# Patient Record
Sex: FEMALE | Race: WHITE | NOT HISPANIC OR LATINO | Employment: FULL TIME | RURAL
[De-identification: names, ages, dates, MRNs, and addresses within clinical notes are randomized per-mention and may not be internally consistent; named-entity substitution may affect disease eponyms.]

---

## 2017-09-13 ENCOUNTER — OFFICE VISIT (OUTPATIENT)
Dept: RETAIL CLINIC | Facility: CLINIC | Age: 27
End: 2017-09-13

## 2017-09-13 VITALS
HEART RATE: 105 BPM | WEIGHT: 157.8 LBS | TEMPERATURE: 98.5 F | BODY MASS INDEX: 27.09 KG/M2 | OXYGEN SATURATION: 98 % | RESPIRATION RATE: 20 BRPM

## 2017-09-13 DIAGNOSIS — J30.1 ALLERGIC RHINITIS DUE TO POLLEN, UNSPECIFIED RHINITIS SEASONALITY: Primary | ICD-10-CM

## 2017-09-13 PROCEDURE — 99213 OFFICE O/P EST LOW 20 MIN: CPT | Performed by: NURSE PRACTITIONER

## 2017-09-13 RX ORDER — FLUTICASONE PROPIONATE 50 MCG
2 SPRAY, SUSPENSION (ML) NASAL DAILY
Qty: 1 BOTTLE | Refills: 0 | Status: SHIPPED | OUTPATIENT
Start: 2017-09-13 | End: 2017-10-13

## 2017-09-13 NOTE — PATIENT INSTRUCTIONS
Nasal Allergies  Nasal allergies are a reaction to allergens in the air. Allergens are particles in the air that cause your body to have an allergic reaction. Nasal allergies are not passed from person to person (are not contagious). They cannot be cured, but they can be controlled.  CAUSES  Seasonal nasal allergies (hay fever) are caused by pollen allergens that come from grasses, trees, and weeds. Year-round nasal allergies (perennial allergic rhinitis) are caused by allergens such as house dust mites, pet dander, and mold spores.  RISK FACTORS  The following factors may make you more likely to develop this condition:  · Having certain health conditions. These include:    Other types of allergies, such as food allergies.    Asthma.    Eczema.  · Having a close relative who has allergies or asthma.  · Exposure to house dust, pollen, dander, or other allergens at home or at work.  · Exposure to air pollution or secondhand smoke when you were a child.  SYMPTOMS  Symptoms of this condition include:  · Sneezing.  · Runny nose or stuffy nose (congestion).  · Watery (tearing) eyes.  · Itchy eyes, nose, mouth, throat, skin, or other area.  · Sore throat.  · Headache.  · Decreased sense of smell or taste.  · Fatigue. This may occur if you have trouble sleeping due to allergies.  · Swollen eyelids.  DIAGNOSIS  This condition is diagnosed with a medical history and physical exam. Allergy testing may be done to determine exactly what triggers your nasal allergies.  TREATMENT  There is no cure for nasal allergies. Treatment focuses on controlling your symptoms, and it may include:  · Medicines that block allergy symptoms. These may include allergy shots, nasal sprays, and oral antihistamines.  · Avoiding the allergen.  HOME CARE INSTRUCTIONS  · Avoid the allergen that is causing your symptoms, if possible.  · Keep windows closed. If possible, use air conditioning when pollen counts are high.  · Do not use fans in your  home.  · Do not hang clothes outside to dry.  · Wear sunglasses to keep pollen out of your eyes.  · Wash your hands right away after you touch household pets.  · Take over-the-counter and prescription medicines only as told by your health care provider.  · Keep all follow-up visits as told by your health care provider. This is important.  SEEK MEDICAL CARE IF:  · You have a fever.  · You develop a cough that does not go away (is persistent).  · You start to wheeze.  · Your symptoms do not improve with treatment.  · You have thick nasal discharge.  · You start to have nosebleeds.  SEEK IMMEDIATE MEDICAL CARE IF:  · Your tongue or your lips are swollen.  · You have trouble breathing.  · You feel light-headed or you feel like you are going to faint.  · You have cold sweats.     This information is not intended to replace advice given to you by your health care provider. Make sure you discuss any questions you have with your health care provider.     Document Released: 12/18/2006 Document Revised: 04/10/2017 Document Reviewed: 06/29/2016  [employer] Interactive Patient Education ©2017 [employer]

## 2017-09-13 NOTE — PROGRESS NOTES
Subjective   Evi Singer is a 27 y.o. female.   Chief Complaint   Patient presents with   • Earache      Earache    There is pain in both ears. This is a new problem. The current episode started in the past 7 days. The problem has been waxing and waning. There has been no fever. Associated symptoms include coughing, rhinorrhea (clear) and a sore throat. Pertinent negatives include no headaches or rash. She has tried nothing for the symptoms.          Evi presents to [facility] with cc of earache today.  Reviewed PMFSH.  See ROS.      The following portions of the patient's history were reviewed and updated as appropriate: allergies, current medications, past family history, past medical history, past social history, past surgical history and problem list.    Review of Systems   Constitutional: Positive for fatigue. Negative for fever.   HENT: Positive for congestion, ear pain, rhinorrhea (clear), sneezing and sore throat.    Eyes: Positive for itching.   Respiratory: Positive for cough. Negative for chest tightness.    Cardiovascular: Negative for chest pain.   Endocrine: Negative for cold intolerance.   Musculoskeletal: Negative for arthralgias.   Skin: Negative for rash.   Neurological: Negative for headaches.   Hematological: Negative for adenopathy.     Pulse 105  Temp 98.5 °F (36.9 °C) (Temporal Artery )   Resp 20  Wt 157 lb 12.8 oz (71.6 kg)  LMP 09/06/2017 (Approximate)  SpO2 98%  BMI 27.09 kg/m2    Objective     Current Outpatient Prescriptions:   •  fluticasone (FLONASE) 50 MCG/ACT nasal spray, 2 sprays into each nostril Daily for 30 days., Disp: 1 bottle, Rfl: 0  Allergies   Allergen Reactions   • Cinnamon Anaphylaxis   • Red Dye Hives       Physical Exam   Constitutional: She is oriented to person, place, and time. She appears well-developed and well-nourished. No distress.   HENT:   Head: Normocephalic and atraumatic.   Right Ear: Tympanic membrane and external ear normal.   Left Ear:  Tympanic membrane and external ear normal.   Nose: Mucosal edema present. Right sinus exhibits no maxillary sinus tenderness and no frontal sinus tenderness. Left sinus exhibits no maxillary sinus tenderness and no frontal sinus tenderness.   Mouth/Throat: Uvula is midline, oropharynx is clear and moist and mucous membranes are normal. No tonsillar abscesses.   Eyes: Conjunctivae and EOM are normal. Pupils are equal, round, and reactive to light.   Neck: Normal range of motion. Neck supple.   Cardiovascular: Regular rhythm and normal heart sounds.  Tachycardia present.    Pulmonary/Chest: Effort normal and breath sounds normal. No respiratory distress. She has no wheezes.   Abdominal: Soft. Bowel sounds are normal.   Musculoskeletal: Normal range of motion.   Lymphadenopathy:     She has no cervical adenopathy.   Neurological: She is alert and oriented to person, place, and time.   Skin: Skin is warm and dry. No rash noted.   Psychiatric: She has a normal mood and affect. Her behavior is normal. Judgment and thought content normal.   Nursing note and vitals reviewed.      Assessment/Plan   Evi was seen today for earache.    Diagnoses and all orders for this visit:    Allergic rhinitis due to pollen, unspecified rhinitis seasonality  -     fluticasone (FLONASE) 50 MCG/ACT nasal spray; 2 sprays into each nostril Daily for 30 days.

## 2017-11-26 ENCOUNTER — OFFICE VISIT (OUTPATIENT)
Dept: RETAIL CLINIC | Facility: CLINIC | Age: 27
End: 2017-11-26

## 2017-11-26 VITALS — TEMPERATURE: 97.8 F | RESPIRATION RATE: 20 BRPM | HEART RATE: 104 BPM | OXYGEN SATURATION: 98 %

## 2017-11-26 DIAGNOSIS — R31.9 URINARY TRACT INFECTION WITH HEMATURIA, SITE UNSPECIFIED: Primary | ICD-10-CM

## 2017-11-26 DIAGNOSIS — N39.0 URINARY TRACT INFECTION WITH HEMATURIA, SITE UNSPECIFIED: Primary | ICD-10-CM

## 2017-11-26 LAB
BILIRUB BLD-MCNC: NEGATIVE MG/DL
GLUCOSE UR STRIP-MCNC: NEGATIVE MG/DL
KETONES UR QL: NEGATIVE
LEUKOCYTE EST, POC: ABNORMAL
NITRITE UR-MCNC: NEGATIVE MG/ML
PH UR: 6 [PH] (ref 5–8)
PROT UR STRIP-MCNC: ABNORMAL MG/DL
RBC # UR STRIP: ABNORMAL /UL
SP GR UR: 1.01 (ref 1–1.03)
UROBILINOGEN UR QL: NORMAL

## 2017-11-26 PROCEDURE — 81002 URINALYSIS NONAUTO W/O SCOPE: CPT | Performed by: NURSE PRACTITIONER

## 2017-11-26 PROCEDURE — 99213 OFFICE O/P EST LOW 20 MIN: CPT | Performed by: NURSE PRACTITIONER

## 2017-11-26 RX ORDER — SULFAMETHOXAZOLE AND TRIMETHOPRIM 800; 160 MG/1; MG/1
1 TABLET ORAL 2 TIMES DAILY
Qty: 20 TABLET | Refills: 0 | Status: SHIPPED | OUTPATIENT
Start: 2017-11-26 | End: 2017-12-06

## 2017-11-26 RX ORDER — FLUCONAZOLE 150 MG/1
150 TABLET ORAL ONCE
Qty: 2 TABLET | Refills: 0 | Status: SHIPPED | OUTPATIENT
Start: 2017-11-26 | End: 2017-11-26

## 2017-11-26 RX ORDER — PHENAZOPYRIDINE HYDROCHLORIDE 200 MG/1
200 TABLET, FILM COATED ORAL 3 TIMES DAILY PRN
Qty: 6 TABLET | Refills: 0 | Status: SHIPPED | OUTPATIENT
Start: 2017-11-26 | End: 2017-11-28

## 2017-11-26 NOTE — PATIENT INSTRUCTIONS
Urinary Tract Infection, Adult  A urinary tract infection (UTI) is an infection of any part of the urinary tract. The urinary tract includes the:  · Kidneys.  · Ureters.  · Bladder.  · Urethra.  These organs make, store, and get rid of pee (urine) in the body.   HOME CARE   · Take over-the-counter and prescription medicines only as told by your doctor.    · If you were prescribed an antibiotic medicine, take it as told by your doctor. Do not stop taking the antibiotic even if you start to feel better.    · Avoid the following drinks:    Alcohol.    Caffeine.    Tea.    Carbonated drinks.  · Drink enough fluid to keep your pee clear or pale yellow.    · Keep all follow-up visits as told by your doctor. This is important.    · Make sure to:      Empty your bladder often and completely. Do not to hold pee for long periods of time.      Empty your bladder before and after sex.      Wipe from front to back after a bowel movement if you are female. Use each tissue one time when you wipe.    GET HELP IF:   · You have back pain.    · You have a fever.    · You feel sick to your stomach (nauseous).  · You throw up (vomit).  · Your symptoms do not get better after 3 days.    · Your symptoms go away and then come back.  GET HELP RIGHT AWAY IF:   · You have very bad back pain.  · You have very bad lower belly (abdominal) pain.  · You are throwing up and cannot keep down any medicines or water.       This information is not intended to replace advice given to you by your health care provider. Make sure you discuss any questions you have with your health care provider.     Document Released: 06/05/2009 Document Revised: 04/10/2017 Document Reviewed: 11/07/2016  [employer] Interactive Patient Education ©2017 [employer]

## 2017-11-26 NOTE — PROGRESS NOTES
Subjective   Evi Singer is a 27 y.o. female.   Chief Complaint   Patient presents with   • Urinary Tract Infection      Urinary Tract Infection    This is a new problem. Episode onset: three days. The problem occurs every urination. The problem has been gradually worsening. The quality of the pain is described as aching and burning. The pain is at a severity of 7/10. The pain is moderate. Associated symptoms include frequency, hematuria and urgency. Treatments tried: AZO. The treatment provided no relief. Her past medical history is significant for kidney stones, recurrent UTIs and a urological procedure.        The following portions of the patient's history were reviewed and updated as appropriate: allergies, current medications, past family history, past medical history, past social history, past surgical history and problem list.    Review of Systems   Genitourinary: Positive for dysuria, frequency, hematuria and urgency.   All other systems reviewed and are negative.      Objective   Allergies   Allergen Reactions   • Cinnamon Anaphylaxis   • Red Dye Hives       Physical Exam   Constitutional: She is oriented to person, place, and time. She appears well-developed and well-nourished.   Abdominal: There is tenderness in the suprapubic area. There is no rigidity, no rebound, no guarding and no CVA tenderness.   Lower back pain   Neurological: She is alert and oriented to person, place, and time.   Skin: Skin is warm and dry.   Psychiatric: She has a normal mood and affect.   Vitals reviewed.      Assessment/Plan   Evi was seen today for urinary tract infection.    Diagnoses and all orders for this visit:    Urinary tract infection with hematuria, site unspecified  -     POC Urinalysis Dipstick  -     Urine Culture - Urine, Urine, Clean Catch    Other orders  -     sulfamethoxazole-trimethoprim (BACTRIM DS,SEPTRA DS) 800-160 MG per tablet; Take 1 tablet by mouth 2 (Two) Times a Day for 10 days.  -      phenazopyridine (PYRIDIUM) 200 MG tablet; Take 1 tablet by mouth 3 (Three) Times a Day As Needed for bladder spasms for up to 2 days.  -     fluconazole (DIFLUCAN) 150 MG tablet; Take 1 tablet by mouth 1 (One) Time for 1 dose. Take one tab at onset of symptoms and the other at end of tx          Results for orders placed or performed in visit on 11/26/17   POC Urinalysis Dipstick   Result Value Ref Range    Glucose, UA Negative Negative, 1000 mg/dL (3+) mg/dL    Bilirubin Negative Negative    Ketones, UA Negative Negative    Specific Gravity  1.010 1.005 - 1.030    Blood, UA Trace (A) Negative    pH, Urine 6.0 5.0 - 8.0    Protein, POC Trace (A) Negative mg/dL    Urobilinogen, UA Normal Normal    Leukocytes Moderate (2+) (A) Negative    Nitrite, UA Negative Negative            This document has been electronically signed by VESTA Abraham November 26, 2017 12:19 PM

## 2017-11-29 LAB
BACTERIA UR CULT: ABNORMAL
BACTERIA UR CULT: ABNORMAL
OTHER ANTIBIOTIC SUSC ISLT: ABNORMAL

## 2017-12-31 ENCOUNTER — OFFICE VISIT (OUTPATIENT)
Dept: RETAIL CLINIC | Facility: CLINIC | Age: 27
End: 2017-12-31

## 2017-12-31 VITALS
RESPIRATION RATE: 20 BRPM | TEMPERATURE: 98 F | HEIGHT: 66 IN | BODY MASS INDEX: 25.23 KG/M2 | OXYGEN SATURATION: 99 % | WEIGHT: 157 LBS

## 2017-12-31 DIAGNOSIS — N30.01 ACUTE CYSTITIS WITH HEMATURIA: Primary | ICD-10-CM

## 2017-12-31 LAB
BILIRUB BLD-MCNC: NEGATIVE MG/DL
CLARITY, POC: ABNORMAL
COLOR UR: ABNORMAL
GLUCOSE UR STRIP-MCNC: NEGATIVE MG/DL
KETONES UR QL: NEGATIVE
LEUKOCYTE EST, POC: ABNORMAL
NITRITE UR-MCNC: POSITIVE MG/ML
PH UR: 6 [PH] (ref 5–8)
PROT UR STRIP-MCNC: ABNORMAL MG/DL
RBC # UR STRIP: ABNORMAL /UL
SP GR UR: 1.03 (ref 1–1.03)
UROBILINOGEN UR QL: NORMAL

## 2017-12-31 PROCEDURE — 81002 URINALYSIS NONAUTO W/O SCOPE: CPT | Performed by: NURSE PRACTITIONER

## 2017-12-31 PROCEDURE — 99213 OFFICE O/P EST LOW 20 MIN: CPT | Performed by: NURSE PRACTITIONER

## 2017-12-31 RX ORDER — PHENAZOPYRIDINE HYDROCHLORIDE 200 MG/1
200 TABLET, FILM COATED ORAL 3 TIMES DAILY PRN
Qty: 6 TABLET | Refills: 0 | Status: ON HOLD | OUTPATIENT
Start: 2017-12-31 | End: 2021-07-31

## 2017-12-31 RX ORDER — FLUCONAZOLE 150 MG/1
150 TABLET ORAL ONCE
Qty: 1 TABLET | Refills: 0 | Status: SHIPPED | OUTPATIENT
Start: 2017-12-31 | End: 2017-12-31

## 2017-12-31 RX ORDER — NITROFURANTOIN 25; 75 MG/1; MG/1
100 CAPSULE ORAL EVERY 12 HOURS SCHEDULED
Qty: 14 CAPSULE | Refills: 0 | Status: ON HOLD | OUTPATIENT
Start: 2017-12-31 | End: 2021-07-31

## 2017-12-31 NOTE — PATIENT INSTRUCTIONS
Urinary Tract Infection, Adult  A urinary tract infection (UTI) is an infection of any part of the urinary tract. The urinary tract includes the:  · Kidneys.  · Ureters.  · Bladder.  · Urethra.  These organs make, store, and get rid of pee (urine) in the body.   HOME CARE   · Take over-the-counter and prescription medicines only as told by your doctor.    · If you were prescribed an antibiotic medicine, take it as told by your doctor. Do not stop taking the antibiotic even if you start to feel better.    · Avoid the following drinks:    Alcohol.    Caffeine.    Tea.    Carbonated drinks.  · Drink enough fluid to keep your pee clear or pale yellow.    · Keep all follow-up visits as told by your doctor. This is important.    · Make sure to:      Empty your bladder often and completely. Do not to hold pee for long periods of time.      Empty your bladder before and after sex.      Wipe from front to back after a bowel movement if you are female. Use each tissue one time when you wipe.    GET HELP IF:   · You have back pain.    · You have a fever.    · You feel sick to your stomach (nauseous).  · You throw up (vomit).  · Your symptoms do not get better after 3 days.    · Your symptoms go away and then come back.  GET HELP RIGHT AWAY IF:   · You have very bad back pain.  · You have very bad lower belly (abdominal) pain.  · You are throwing up and cannot keep down any medicines or water.       This information is not intended to replace advice given to you by your health care provider. Make sure you discuss any questions you have with your health care provider.     Document Released: 06/05/2009 Document Revised: 04/10/2017 Document Reviewed: 11/07/2016  [employer] Interactive Patient Education ©2017 [employer]

## 2017-12-31 NOTE — PROGRESS NOTES
"Subjective   Evi Singer is a 27 y.o. female.   Chief Complaint   Patient presents with   • Urinary Frequency      Urinary Frequency    This is a new problem. The current episode started in the past 7 days (5 days). The problem occurs every urination. The problem has been gradually worsening. The quality of the pain is described as burning. The pain is at a severity of 7/10. There has been no fever. She is not sexually active. There is no history of pyelonephritis. Associated symptoms include frequency, hesitancy and urgency. Pertinent negatives include no flank pain.        The following portions of the patient's history were reviewed and updated as appropriate: allergies, current medications, past family history, past medical history, past social history, past surgical history and problem list.    Current Outpatient Prescriptions:   •  fluconazole (DIFLUCAN) 150 MG tablet, Take 1 tablet by mouth 1 (One) Time for 1 dose., Disp: 1 tablet, Rfl: 0  •  nitrofurantoin, macrocrystal-monohydrate, (MACROBID) 100 MG capsule, Take 1 capsule by mouth Every 12 (Twelve) Hours., Disp: 14 capsule, Rfl: 0  •  phenazopyridine (PYRIDIUM) 200 MG tablet, Take 1 tablet by mouth 3 (Three) Times a Day As Needed for bladder spasms., Disp: 6 tablet, Rfl: 0    Review of Systems   Constitutional: Negative.    HENT: Negative.    Eyes: Negative.    Respiratory: Negative.    Cardiovascular: Negative.    Gastrointestinal: Negative.    Genitourinary: Positive for frequency, hesitancy and urgency. Negative for flank pain.     Temp 98 °F (36.7 °C) (Temporal Artery )   Resp 20  Ht 166.4 cm (65.5\")  Wt 71.2 kg (157 lb)  LMP 12/27/2017  SpO2 99%  BMI 25.73 kg/m2    Objective   Allergies   Allergen Reactions   • Cinnamon Anaphylaxis   • Red Dye Hives       Physical Exam   Constitutional: She is oriented to person, place, and time. She appears well-developed and well-nourished.   Cardiovascular: Normal rate, regular rhythm and normal heart " sounds.  Exam reveals no gallop and no friction rub.    No murmur heard.  Pulmonary/Chest: Effort normal.   Abdominal: Soft. Bowel sounds are normal. She exhibits no mass. There is tenderness in the suprapubic area. There is no rebound, no guarding and no CVA tenderness. No hernia.   Neurological: She is alert and oriented to person, place, and time.   Psychiatric: She has a normal mood and affect. Her behavior is normal.   Vitals reviewed.      Assessment/Plan   Evi was seen today for urinary frequency.    Diagnoses and all orders for this visit:    Acute cystitis with hematuria  -     POC Urinalysis Dipstick    Other orders  -     nitrofurantoin, macrocrystal-monohydrate, (MACROBID) 100 MG capsule; Take 1 capsule by mouth Every 12 (Twelve) Hours.  -     phenazopyridine (PYRIDIUM) 200 MG tablet; Take 1 tablet by mouth 3 (Three) Times a Day As Needed for bladder spasms.  -     fluconazole (DIFLUCAN) 150 MG tablet; Take 1 tablet by mouth 1 (One) Time for 1 dose.                 December 31, 2017 2:14 PM

## 2020-02-06 ENCOUNTER — TRANSCRIBE ORDERS (OUTPATIENT)
Dept: ADMINISTRATIVE | Facility: HOSPITAL | Age: 30
End: 2020-02-06

## 2020-02-06 DIAGNOSIS — R43.0 ANOSMIA: Primary | ICD-10-CM

## 2020-02-06 DIAGNOSIS — J32.4 CHRONIC PANSINUSITIS: ICD-10-CM

## 2020-02-10 ENCOUNTER — APPOINTMENT (OUTPATIENT)
Dept: CT IMAGING | Facility: HOSPITAL | Age: 30
End: 2020-02-10

## 2020-02-11 ENCOUNTER — HOSPITAL ENCOUNTER (OUTPATIENT)
Dept: CT IMAGING | Facility: HOSPITAL | Age: 30
Discharge: HOME | End: 2020-02-11
Admitting: OTOLARYNGOLOGY

## 2020-02-11 DIAGNOSIS — J32.4 CHRONIC PANSINUSITIS: ICD-10-CM

## 2020-02-11 DIAGNOSIS — R43.0 ANOSMIA: ICD-10-CM

## 2020-02-11 LAB — B-HCG UR QL: NEGATIVE

## 2020-02-11 PROCEDURE — 70486 CT MAXILLOFACIAL W/O DYE: CPT | Performed by: RADIOLOGY

## 2020-02-11 PROCEDURE — 81025 URINE PREGNANCY TEST: CPT | Performed by: RADIOLOGY

## 2020-02-11 PROCEDURE — 70486 CT MAXILLOFACIAL W/O DYE: CPT

## 2020-11-04 ENCOUNTER — LAB (OUTPATIENT)
Dept: LAB | Facility: HOSPITAL | Age: 30
End: 2020-11-04

## 2020-11-04 ENCOUNTER — TRANSCRIBE ORDERS (OUTPATIENT)
Dept: ADMINISTRATIVE | Facility: HOSPITAL | Age: 30
End: 2020-11-04

## 2020-11-04 DIAGNOSIS — J01.90 ACUTE SINUSITIS, RECURRENCE NOT SPECIFIED, UNSPECIFIED LOCATION: Primary | ICD-10-CM

## 2020-11-04 PROCEDURE — 87070 CULTURE OTHR SPECIMN AEROBIC: CPT | Performed by: OTOLARYNGOLOGY

## 2020-11-04 PROCEDURE — 87205 SMEAR GRAM STAIN: CPT | Performed by: OTOLARYNGOLOGY

## 2020-11-07 LAB
BACTERIA SPEC AEROBE CULT: NORMAL
GRAM STN SPEC: NORMAL

## 2021-02-05 LAB
EXTERNAL ABO GROUPING: NORMAL
EXTERNAL ANTIBODY SCREEN: NEGATIVE
EXTERNAL CHLAMYDIA SCREEN: NORMAL
EXTERNAL GONORRHEA SCREEN: NORMAL
EXTERNAL HEPATITIS B SURFACE ANTIGEN: NEGATIVE
EXTERNAL RH FACTOR: POSITIVE
EXTERNAL RUBELLA QUALITATIVE: NORMAL
EXTERNAL SYPHILIS RPR SCREEN: NORMAL
HIV1 P24 AG SERPL QL IA: NEGATIVE

## 2021-05-05 ENCOUNTER — HOSPITAL ENCOUNTER (EMERGENCY)
Facility: HOSPITAL | Age: 31
Discharge: HOME | End: 2021-05-05
Attending: EMERGENCY MEDICINE | Admitting: EMERGENCY MEDICINE

## 2021-05-05 VITALS
HEART RATE: 92 BPM | OXYGEN SATURATION: 97 % | DIASTOLIC BLOOD PRESSURE: 83 MMHG | BODY MASS INDEX: 33.32 KG/M2 | TEMPERATURE: 98.3 F | WEIGHT: 200 LBS | HEIGHT: 65 IN | SYSTOLIC BLOOD PRESSURE: 144 MMHG | RESPIRATION RATE: 20 BRPM

## 2021-05-05 DIAGNOSIS — R21 RASH: Primary | ICD-10-CM

## 2021-05-05 PROCEDURE — 99283 EMERGENCY DEPT VISIT LOW MDM: CPT

## 2021-05-05 RX ORDER — CETIRIZINE HYDROCHLORIDE 10 MG/1
10 TABLET ORAL DAILY
Qty: 30 TABLET | Refills: 0 | Status: SHIPPED | OUTPATIENT
Start: 2021-05-05 | End: 2021-08-16 | Stop reason: HOSPADM

## 2021-05-05 RX ORDER — HYDROXYZINE HYDROCHLORIDE 25 MG/1
25 TABLET, FILM COATED ORAL EVERY 6 HOURS PRN
Qty: 30 TABLET | Refills: 0 | Status: SHIPPED | OUTPATIENT
Start: 2021-05-05 | End: 2021-08-16 | Stop reason: HOSPADM

## 2021-05-05 RX ORDER — HYDROXYZINE HYDROCHLORIDE 25 MG/1
25 TABLET, FILM COATED ORAL ONCE
Status: COMPLETED | OUTPATIENT
Start: 2021-05-05 | End: 2021-05-05

## 2021-05-05 RX ORDER — CETIRIZINE HYDROCHLORIDE 10 MG/1
10 TABLET ORAL ONCE
Status: COMPLETED | OUTPATIENT
Start: 2021-05-05 | End: 2021-05-05

## 2021-05-05 RX ADMIN — HYDROXYZINE HYDROCHLORIDE 25 MG: 25 TABLET, FILM COATED ORAL at 14:26

## 2021-05-05 RX ADMIN — CETIRIZINE HYDROCHLORIDE 10 MG: 10 TABLET, FILM COATED ORAL at 14:26

## 2021-06-16 ENCOUNTER — HOSPITAL ENCOUNTER (OUTPATIENT)
Facility: HOSPITAL | Age: 31
End: 2021-06-16
Attending: OBSTETRICS & GYNECOLOGY | Admitting: OBSTETRICS & GYNECOLOGY

## 2021-06-16 ENCOUNTER — APPOINTMENT (OUTPATIENT)
Dept: ULTRASOUND IMAGING | Facility: HOSPITAL | Age: 31
End: 2021-06-16

## 2021-06-16 ENCOUNTER — HOSPITAL ENCOUNTER (OUTPATIENT)
Facility: HOSPITAL | Age: 31
Discharge: HOME | End: 2021-06-16
Attending: OBSTETRICS & GYNECOLOGY | Admitting: OBSTETRICS & GYNECOLOGY

## 2021-06-16 VITALS — BODY MASS INDEX: 38.46 KG/M2 | HEIGHT: 64 IN | TEMPERATURE: 98 F | RESPIRATION RATE: 18 BRPM | WEIGHT: 225.3 LBS

## 2021-06-16 PROBLEM — Z34.90 PREGNANT: Status: ACTIVE | Noted: 2021-06-16

## 2021-06-16 PROCEDURE — G0463 HOSPITAL OUTPT CLINIC VISIT: HCPCS

## 2021-06-16 PROCEDURE — 59025 FETAL NON-STRESS TEST: CPT

## 2021-06-16 PROCEDURE — 76819 FETAL BIOPHYS PROFIL W/O NST: CPT | Performed by: RADIOLOGY

## 2021-06-16 PROCEDURE — 76819 FETAL BIOPHYS PROFIL W/O NST: CPT

## 2021-06-16 NOTE — NON STRESS TEST
Evi Hare, a  at 28w2d with an JAMES of 2021, Date entered prior to episode creation, was seen at [facility] LABOR DELIVERY for a nonstress test.    No chief complaint on file.      Patient Active Problem List   Diagnosis   • Pregnant       Start Time: 1230  Stop Time: 1300    Interpretation A  Nonstress Test Interpretation A: Reactive (21 1348 : Klarissa Reyes, RN)  Comments A: VERIFIED WITH SARAH MCCARTY RN (21 1348 : Klarissa Reyes, RN)

## 2021-07-19 ENCOUNTER — HOSPITAL ENCOUNTER (OUTPATIENT)
Facility: HOSPITAL | Age: 31
Discharge: HOME | End: 2021-07-19
Attending: OBSTETRICS & GYNECOLOGY | Admitting: OBSTETRICS & GYNECOLOGY

## 2021-07-19 VITALS — RESPIRATION RATE: 18 BRPM | BODY MASS INDEX: 40.39 KG/M2 | TEMPERATURE: 97.7 F | WEIGHT: 236.6 LBS | HEIGHT: 64 IN

## 2021-07-19 PROBLEM — Z34.90 PREGNANCY: Status: ACTIVE | Noted: 2021-07-19

## 2021-07-19 PROCEDURE — 59025 FETAL NON-STRESS TEST: CPT

## 2021-07-19 PROCEDURE — G0463 HOSPITAL OUTPT CLINIC VISIT: HCPCS

## 2021-07-19 NOTE — DISCHARGE INSTRUCTIONS
Nonstress Test  A nonstress test is a procedure that is done during pregnancy in order to check the baby's heartbeat. The procedure can help show if the baby (fetus) is healthy. It is commonly done when:  · The baby is past his or her due date.  · The pregnancy is high risk.  · The baby is moving less than normal.  · The mother has lost a pregnancy in the past.  · The health care provider suspects a problem with the baby's growth.  · There is too much or too little amniotic fluid.  The procedure is often done in the third trimester of pregnancy to find out if an early delivery is needed and whether such a delivery is safe.  During a nonstress test, the baby's heartbeat is monitored when the baby is resting and when the baby is moving. If the baby is healthy, the heart rate will increase when he or she moves or kicks and will return to normal when he or she rests.  Tell a health care provider about:  · Any allergies you have.  · Any medical conditions you have.  · All medicines you are taking, including vitamins, herbs, eye drops, creams, and over-the-counter medicines.  What are the risks?  There are no risks to you or your baby from a nonstress test. This procedure should not be painful or uncomfortable.  What happens before the procedure?  · Eat a meal right before the test or as directed by your health care provider. Food may help encourage the baby to move.  · Use the restroom right before the test.  What happens during the procedure?  · Two monitors will be placed on your abdomen. One will record the baby's heart rate and the other will record the contractions of your uterus.  · You may be asked to lie down on your side or to sit upright.  · You may be given a button to press when you feel your baby move.  · Your health care provider will listen to your baby's heartbeat and recorded it. He or she may also watch your baby's heartbeat on a screen.  · If the baby seems to be sleeping, you may be asked to drink  some juice or soda, eat a snack, or change positions.  The procedure may vary among health care providers and hospitals.  What happens after the procedure?  · Your health care provider will discuss the test results with you and make recommendations for the future. Depending on the results, your health care provider may order additional tests or another course of action.  · If your health care provider gave you any diet or activity instructions, make sure to follow them.  · Keep all follow-up visits as told by your health care provider. This is important.  Summary  · A nonstress test is a procedure that is done during pregnancy in order to check the baby's heartbeat. The procedure can help show if the baby is healthy.  · The procedure is often done in the third trimester of pregnancy to find out if an early delivery is needed and whether such a delivery is safe.  · During a nonstress test, the baby's heartbeat is monitored when the baby is resting and when the baby is moving. If the baby is healthy, the heart rate will increase when he or she moves or kicks and will return to normal when he or she rests.  · Your health care provider will discuss the test results with you and make recommendations for the future.  This information is not intended to replace advice given to you by your health care provider. Make sure you discuss any questions you have with your health care provider.  Document Revised: 03/29/2018 Document Reviewed: 03/29/2018  [employer] Patient Education © 2021 [employer]

## 2021-07-19 NOTE — NON STRESS TEST
Evi Hare, a  at 33w0d with an JAMES of 2021, Date entered prior to episode creation, was seen at [facility] LABOR DELIVERY for a nonstress test.    Chief Complaint   Patient presents with   • Non-stress Test     PUPPS       Patient Active Problem List   Diagnosis   • Pregnant   • Pregnancy       Start Time: 1020  Stop Time: 1040    Interpretation A  Nonstress Test Interpretation A: Reactive (21 1021 : Hanny Roy, RN)    ALEXIS MCCARTY RN

## 2021-07-31 ENCOUNTER — HOSPITAL ENCOUNTER (OUTPATIENT)
Dept: LABOR AND DELIVERY | Facility: HOSPITAL | Age: 31
Discharge: HOME | End: 2021-07-31

## 2021-07-31 ENCOUNTER — HOSPITAL ENCOUNTER (OUTPATIENT)
Facility: HOSPITAL | Age: 31
Discharge: HOME | End: 2021-07-31
Attending: OBSTETRICS & GYNECOLOGY | Admitting: OBSTETRICS & GYNECOLOGY

## 2021-07-31 VITALS
DIASTOLIC BLOOD PRESSURE: 91 MMHG | HEIGHT: 64 IN | RESPIRATION RATE: 22 BRPM | TEMPERATURE: 98.2 F | HEART RATE: 92 BPM | WEIGHT: 242.5 LBS | SYSTOLIC BLOOD PRESSURE: 140 MMHG | BODY MASS INDEX: 41.4 KG/M2

## 2021-07-31 PROBLEM — O16.9 HYPERTENSION AFFECTING PREGNANCY: Status: ACTIVE | Noted: 2021-07-31

## 2021-07-31 LAB
ALBUMIN SERPL-MCNC: 3.04 G/DL (ref 3.5–5.2)
ALBUMIN/GLOB SERPL: 1 G/DL
ALP SERPL-CCNC: 173 U/L (ref 39–117)
ALT SERPL W P-5'-P-CCNC: 14 U/L (ref 1–33)
ANION GAP SERPL CALCULATED.3IONS-SCNC: 12.5 MMOL/L (ref 5–15)
AST SERPL-CCNC: 12 U/L (ref 1–32)
BILIRUB SERPL-MCNC: <0.2 MG/DL (ref 0–1.2)
BUN SERPL-MCNC: 8 MG/DL (ref 6–20)
BUN/CREAT SERPL: 17.8 (ref 7–25)
CALCIUM SPEC-SCNC: 9.4 MG/DL (ref 8.6–10.5)
CHLORIDE SERPL-SCNC: 105 MMOL/L (ref 98–107)
CO2 SERPL-SCNC: 18.5 MMOL/L (ref 22–29)
COLLECT DURATION TIME UR: 24 HRS
CREAT SERPL-MCNC: 0.45 MG/DL (ref 0.57–1)
CREAT UR-MCNC: 61.2 MG/DL
CREATINE 24H UR-MRATE: 1.35 G/24 HR (ref 0.7–1.6)
DEPRECATED RDW RBC AUTO: 46.8 FL (ref 37–54)
EOSINOPHIL # BLD MANUAL: 0.16 10*3/MM3 (ref 0–0.4)
EOSINOPHIL NFR BLD MANUAL: 1 % (ref 0.3–6.2)
ERYTHROCYTE [DISTWIDTH] IN BLOOD BY AUTOMATED COUNT: 13.3 % (ref 12.3–15.4)
GFR SERPL CREATININE-BSD FRML MDRD: >150 ML/MIN/1.73
GLOBULIN UR ELPH-MCNC: 3 GM/DL
GLUCOSE SERPL-MCNC: 111 MG/DL (ref 65–99)
HCT VFR BLD AUTO: 38.6 % (ref 34–46.6)
HGB BLD-MCNC: 12.8 G/DL (ref 12–15.9)
LYMPHOCYTES # BLD MANUAL: 2.42 10*3/MM3 (ref 0.7–3.1)
LYMPHOCYTES NFR BLD MANUAL: 15 % (ref 19.6–45.3)
LYMPHOCYTES NFR BLD MANUAL: 5 % (ref 5–12)
MCH RBC QN AUTO: 31.9 PG (ref 26.6–33)
MCHC RBC AUTO-ENTMCNC: 33.2 G/DL (ref 31.5–35.7)
MCV RBC AUTO: 96.3 FL (ref 79–97)
MONOCYTES # BLD AUTO: 0.81 10*3/MM3 (ref 0.1–0.9)
NEUTROPHILS # BLD AUTO: 12.76 10*3/MM3 (ref 1.7–7)
NEUTROPHILS NFR BLD MANUAL: 78 % (ref 42.7–76)
NEUTS BAND NFR BLD MANUAL: 1 % (ref 0–5)
PLAT MORPH BLD: NORMAL
PLATELET # BLD AUTO: 253 10*3/MM3 (ref 140–450)
PMV BLD AUTO: 10.3 FL (ref 6–12)
POTASSIUM SERPL-SCNC: 3.7 MMOL/L (ref 3.5–5.2)
PROT 24H UR-MRATE: 310.2 MG/24HOURS (ref 0–150)
PROT SERPL-MCNC: 6 G/DL (ref 6–8.5)
RBC # BLD AUTO: 4.01 10*6/MM3 (ref 3.77–5.28)
RBC MORPH BLD: NORMAL
SCAN SLIDE: NORMAL
SODIUM SERPL-SCNC: 136 MMOL/L (ref 136–145)
SPECIMEN VOL 24H UR: 2200 ML
URATE SERPL-MCNC: 5.1 MG/DL (ref 2.4–5.7)
WBC # BLD AUTO: 16.15 10*3/MM3 (ref 3.4–10.8)

## 2021-07-31 PROCEDURE — 80053 COMPREHEN METABOLIC PANEL: CPT | Performed by: OBSTETRICS & GYNECOLOGY

## 2021-07-31 PROCEDURE — 36415 COLL VENOUS BLD VENIPUNCTURE: CPT | Performed by: OBSTETRICS & GYNECOLOGY

## 2021-07-31 PROCEDURE — 25010000002 BETAMETHASONE ACET & SOD PHOS PER 4 MG: Performed by: OBSTETRICS & GYNECOLOGY

## 2021-07-31 PROCEDURE — 85007 BL SMEAR W/DIFF WBC COUNT: CPT | Performed by: OBSTETRICS & GYNECOLOGY

## 2021-07-31 PROCEDURE — 84550 ASSAY OF BLOOD/URIC ACID: CPT | Performed by: OBSTETRICS & GYNECOLOGY

## 2021-07-31 PROCEDURE — 59025 FETAL NON-STRESS TEST: CPT

## 2021-07-31 PROCEDURE — 85025 COMPLETE CBC W/AUTO DIFF WBC: CPT | Performed by: OBSTETRICS & GYNECOLOGY

## 2021-07-31 PROCEDURE — 96372 THER/PROPH/DIAG INJ SC/IM: CPT

## 2021-07-31 PROCEDURE — 84156 ASSAY OF PROTEIN URINE: CPT | Performed by: OBSTETRICS & GYNECOLOGY

## 2021-07-31 PROCEDURE — 81050 URINALYSIS VOLUME MEASURE: CPT | Performed by: OBSTETRICS & GYNECOLOGY

## 2021-07-31 PROCEDURE — G0463 HOSPITAL OUTPT CLINIC VISIT: HCPCS

## 2021-07-31 PROCEDURE — 82570 ASSAY OF URINE CREATININE: CPT | Performed by: OBSTETRICS & GYNECOLOGY

## 2021-07-31 PROCEDURE — 82340 ASSAY OF CALCIUM IN URINE: CPT | Performed by: OBSTETRICS & GYNECOLOGY

## 2021-07-31 RX ORDER — BETAMETHASONE SODIUM PHOSPHATE AND BETAMETHASONE ACETATE 3; 3 MG/ML; MG/ML
12 INJECTION, SUSPENSION INTRA-ARTICULAR; INTRALESIONAL; INTRAMUSCULAR; SOFT TISSUE EVERY 24 HOURS
Status: DISCONTINUED | OUTPATIENT
Start: 2021-07-31 | End: 2021-07-31 | Stop reason: HOSPADM

## 2021-07-31 RX ORDER — BUSPIRONE HYDROCHLORIDE 5 MG/1
5 TABLET ORAL 2 TIMES DAILY
COMMUNITY
End: 2021-08-16 | Stop reason: HOSPADM

## 2021-07-31 RX ORDER — METRONIDAZOLE 500 MG/1
500 TABLET ORAL 3 TIMES DAILY
Status: ON HOLD | COMMUNITY
End: 2021-08-07

## 2021-07-31 RX ORDER — PRENATAL VIT/IRON FUM/FOLIC AC 27MG-0.8MG
1 TABLET ORAL DAILY
COMMUNITY

## 2021-07-31 RX ADMIN — BETAMETHASONE SODIUM PHOSPHATE AND BETAMETHASONE ACETATE 12 MG: 3; 3 INJECTION, SUSPENSION INTRA-ARTICULAR; INTRALESIONAL; INTRAMUSCULAR at 11:35

## 2021-08-01 LAB
CALCIUM 24H UR-MCNC: 18.9 MG/DL
CALCIUM 24H UR-MRATE: 415.8 MG/24 HR (ref 100–300)
COLLECT DURATION TIME UR: 24 HRS
SPECIMEN VOL 24H UR: 2200 ML

## 2021-08-07 ENCOUNTER — HOSPITAL ENCOUNTER (OUTPATIENT)
Facility: HOSPITAL | Age: 31
Discharge: HOME | End: 2021-08-07
Attending: OBSTETRICS & GYNECOLOGY | Admitting: OBSTETRICS & GYNECOLOGY

## 2021-08-07 VITALS
HEIGHT: 65 IN | WEIGHT: 240 LBS | TEMPERATURE: 98.1 F | RESPIRATION RATE: 20 BRPM | BODY MASS INDEX: 39.99 KG/M2 | HEART RATE: 98 BPM | SYSTOLIC BLOOD PRESSURE: 140 MMHG | DIASTOLIC BLOOD PRESSURE: 95 MMHG

## 2021-08-07 PROBLEM — R03.0 ELEVATED BLOOD PRESSURE READING: Status: ACTIVE | Noted: 2021-08-07

## 2021-08-07 LAB — PROT 24H UR-MRATE: 295.2 MG/24HOURS (ref 0–150)

## 2021-08-07 PROCEDURE — 59025 FETAL NON-STRESS TEST: CPT

## 2021-08-07 PROCEDURE — 84156 ASSAY OF PROTEIN URINE: CPT | Performed by: OBSTETRICS & GYNECOLOGY

## 2021-08-07 PROCEDURE — 81050 URINALYSIS VOLUME MEASURE: CPT | Performed by: OBSTETRICS & GYNECOLOGY

## 2021-08-07 PROCEDURE — G0463 HOSPITAL OUTPT CLINIC VISIT: HCPCS

## 2021-08-07 RX ORDER — OMEPRAZOLE 20 MG/1
20 CAPSULE, DELAYED RELEASE ORAL DAILY
COMMUNITY

## 2021-08-07 NOTE — NON STRESS TEST
Evi Hare, a  at 35w5d with an JAMES of 2021, Date entered prior to episode creation, was seen at [facility] LABOR DELIVERY for a nonstress test.    Chief Complaint   Patient presents with   • Non-stress Test     ELEVATED BP, RETURNING 24 HR URINE       Patient Active Problem List   Diagnosis   • Pregnant   • Pregnancy   • Hypertension affecting pregnancy   • Elevated blood pressure reading       Start Time: 1320  Stop Time: 1340    Interpretation A  Nonstress Test Interpretation A: Reactive (21 1430 : María Ball, RN)  Comments A: VERIFIED BY ESTELA TURNER RN (21 1430 : María Ball, VINCENT)

## 2021-08-12 ENCOUNTER — APPOINTMENT (OUTPATIENT)
Dept: ULTRASOUND IMAGING | Facility: HOSPITAL | Age: 31
End: 2021-08-12

## 2021-08-12 ENCOUNTER — HOSPITAL ENCOUNTER (INPATIENT)
Facility: HOSPITAL | Age: 31
LOS: 2 days | Discharge: HOME | End: 2021-08-16
Attending: OBSTETRICS & GYNECOLOGY | Admitting: OBSTETRICS & GYNECOLOGY

## 2021-08-12 LAB
ABO GROUP BLD: NORMAL
ABO GROUP BLD: NORMAL
ALBUMIN SERPL-MCNC: 3.05 G/DL (ref 3.5–5.2)
ALBUMIN/GLOB SERPL: 1.2 G/DL
ALP SERPL-CCNC: 192 U/L (ref 39–117)
ALT SERPL W P-5'-P-CCNC: 11 U/L (ref 1–33)
ANION GAP SERPL CALCULATED.3IONS-SCNC: 9.1 MMOL/L (ref 5–15)
AST SERPL-CCNC: 13 U/L (ref 1–32)
BILIRUB SERPL-MCNC: 0.2 MG/DL (ref 0–1.2)
BILIRUB UR QL STRIP: NEGATIVE
BLD GP AB SCN SERPL QL: NEGATIVE
BUN SERPL-MCNC: 8 MG/DL (ref 6–20)
BUN/CREAT SERPL: 14.8 (ref 7–25)
CALCIUM SPEC-SCNC: 9.9 MG/DL (ref 8.6–10.5)
CHLORIDE SERPL-SCNC: 104 MMOL/L (ref 98–107)
CLARITY UR: ABNORMAL
CO2 SERPL-SCNC: 20.9 MMOL/L (ref 22–29)
COLOR UR: YELLOW
CREAT SERPL-MCNC: 0.54 MG/DL (ref 0.57–1)
DEPRECATED RDW RBC AUTO: 47.2 FL (ref 37–54)
ERYTHROCYTE [DISTWIDTH] IN BLOOD BY AUTOMATED COUNT: 13.3 % (ref 12.3–15.4)
EXTERNAL GROUP B STREP ANTIGEN: NORMAL
FLUAV RNA RESP QL NAA+PROBE: NOT DETECTED
FLUBV RNA RESP QL NAA+PROBE: NOT DETECTED
GFR SERPL CREATININE-BSD FRML MDRD: 133 ML/MIN/1.73
GLOBULIN UR ELPH-MCNC: 2.7 GM/DL
GLUCOSE SERPL-MCNC: 121 MG/DL (ref 65–99)
GLUCOSE UR STRIP-MCNC: NEGATIVE MG/DL
HCT VFR BLD AUTO: 41.6 % (ref 34–46.6)
HGB BLD-MCNC: 13.6 G/DL (ref 12–15.9)
HGB UR QL STRIP.AUTO: NEGATIVE
KETONES UR QL STRIP: NEGATIVE
LEUKOCYTE ESTERASE UR QL STRIP.AUTO: ABNORMAL
MCH RBC QN AUTO: 31.3 PG (ref 26.6–33)
MCHC RBC AUTO-ENTMCNC: 32.7 G/DL (ref 31.5–35.7)
MCV RBC AUTO: 95.6 FL (ref 79–97)
NITRITE UR QL STRIP: NEGATIVE
PH UR STRIP.AUTO: 6 [PH] (ref 5–8)
PLATELET # BLD AUTO: 221 10*3/MM3 (ref 140–450)
PMV BLD AUTO: 10.1 FL (ref 6–12)
POTASSIUM SERPL-SCNC: 4 MMOL/L (ref 3.5–5.2)
PROT SERPL-MCNC: 5.7 G/DL (ref 6–8.5)
PROT UR QL STRIP: ABNORMAL
PROT UR-MCNC: 27.4 MG/DL
RBC # BLD AUTO: 4.35 10*6/MM3 (ref 3.77–5.28)
RH BLD: POSITIVE
RH BLD: POSITIVE
SARS-COV-2 RNA RESP QL NAA+PROBE: NOT DETECTED
SODIUM SERPL-SCNC: 134 MMOL/L (ref 136–145)
SP GR UR STRIP: 1.01 (ref 1–1.03)
T&S EXPIRATION DATE: NORMAL
URATE SERPL-MCNC: 5.3 MG/DL (ref 2.4–5.7)
UROBILINOGEN UR QL STRIP: ABNORMAL
WBC # BLD AUTO: 10.77 10*3/MM3 (ref 3.4–10.8)

## 2021-08-12 PROCEDURE — 81003 URINALYSIS AUTO W/O SCOPE: CPT | Performed by: OBSTETRICS & GYNECOLOGY

## 2021-08-12 PROCEDURE — 84156 ASSAY OF PROTEIN URINE: CPT | Performed by: OBSTETRICS & GYNECOLOGY

## 2021-08-12 PROCEDURE — G0463 HOSPITAL OUTPT CLINIC VISIT: HCPCS

## 2021-08-12 PROCEDURE — 85027 COMPLETE CBC AUTOMATED: CPT | Performed by: OBSTETRICS & GYNECOLOGY

## 2021-08-12 PROCEDURE — 76819 FETAL BIOPHYS PROFIL W/O NST: CPT | Performed by: RADIOLOGY

## 2021-08-12 PROCEDURE — 86850 RBC ANTIBODY SCREEN: CPT | Performed by: OBSTETRICS & GYNECOLOGY

## 2021-08-12 PROCEDURE — 84550 ASSAY OF BLOOD/URIC ACID: CPT | Performed by: OBSTETRICS & GYNECOLOGY

## 2021-08-12 PROCEDURE — 87636 SARSCOV2 & INF A&B AMP PRB: CPT | Performed by: OBSTETRICS & GYNECOLOGY

## 2021-08-12 PROCEDURE — 80053 COMPREHEN METABOLIC PANEL: CPT | Performed by: OBSTETRICS & GYNECOLOGY

## 2021-08-12 PROCEDURE — 86901 BLOOD TYPING SEROLOGIC RH(D): CPT

## 2021-08-12 PROCEDURE — 59025 FETAL NON-STRESS TEST: CPT

## 2021-08-12 PROCEDURE — 86901 BLOOD TYPING SEROLOGIC RH(D): CPT | Performed by: OBSTETRICS & GYNECOLOGY

## 2021-08-12 PROCEDURE — 86900 BLOOD TYPING SEROLOGIC ABO: CPT

## 2021-08-12 PROCEDURE — G0378 HOSPITAL OBSERVATION PER HR: HCPCS

## 2021-08-12 PROCEDURE — 76819 FETAL BIOPHYS PROFIL W/O NST: CPT

## 2021-08-12 PROCEDURE — 86900 BLOOD TYPING SEROLOGIC ABO: CPT | Performed by: OBSTETRICS & GYNECOLOGY

## 2021-08-12 RX ORDER — CALCIUM CARBONATE 200(500)MG
3 TABLET,CHEWABLE ORAL 3 TIMES DAILY PRN
Status: DISCONTINUED | OUTPATIENT
Start: 2021-08-12 | End: 2021-08-16 | Stop reason: HOSPADM

## 2021-08-12 RX ORDER — HYDROXYZINE 50 MG/1
25 TABLET, FILM COATED ORAL EVERY 6 HOURS PRN
Status: CANCELLED | OUTPATIENT
Start: 2021-08-12

## 2021-08-12 RX ORDER — SODIUM CHLORIDE 0.9 % (FLUSH) 0.9 %
10 SYRINGE (ML) INJECTION AS NEEDED
Status: DISCONTINUED | OUTPATIENT
Start: 2021-08-12 | End: 2021-08-14 | Stop reason: HOSPADM

## 2021-08-12 RX ORDER — PRENATAL VIT/IRON FUM/FOLIC AC 27MG-0.8MG
1 TABLET ORAL DAILY
Status: CANCELLED | OUTPATIENT
Start: 2021-08-13

## 2021-08-12 RX ORDER — CETIRIZINE HYDROCHLORIDE 10 MG/1
10 TABLET ORAL DAILY
Status: CANCELLED | OUTPATIENT
Start: 2021-08-13

## 2021-08-12 RX ORDER — BUSPIRONE HYDROCHLORIDE 5 MG/1
5 TABLET ORAL 2 TIMES DAILY
Status: CANCELLED | OUTPATIENT
Start: 2021-08-12

## 2021-08-12 RX ORDER — SODIUM CHLORIDE, SODIUM LACTATE, POTASSIUM CHLORIDE, CALCIUM CHLORIDE 600; 310; 30; 20 MG/100ML; MG/100ML; MG/100ML; MG/100ML
125 INJECTION, SOLUTION INTRAVENOUS CONTINUOUS
Status: DISCONTINUED | OUTPATIENT
Start: 2021-08-12 | End: 2021-08-12

## 2021-08-12 RX ORDER — HYDROXYZINE 50 MG/1
50 TABLET, FILM COATED ORAL 3 TIMES DAILY PRN
Status: DISCONTINUED | OUTPATIENT
Start: 2021-08-12 | End: 2021-08-16 | Stop reason: HOSPADM

## 2021-08-12 RX ORDER — PANTOPRAZOLE SODIUM 40 MG/1
40 TABLET, DELAYED RELEASE ORAL EVERY MORNING
Status: CANCELLED | OUTPATIENT
Start: 2021-08-13

## 2021-08-12 RX ORDER — BUTALBITAL, ACETAMINOPHEN AND CAFFEINE 50; 325; 40 MG/1; MG/1; MG/1
1 TABLET ORAL EVERY 4 HOURS PRN
Status: DISCONTINUED | OUTPATIENT
Start: 2021-08-12 | End: 2021-08-16 | Stop reason: HOSPADM

## 2021-08-12 RX ADMIN — HYDROXYZINE HYDROCHLORIDE 50 MG: 50 TABLET ORAL at 22:53

## 2021-08-12 RX ADMIN — BUTALBITAL, ACETAMINOPHEN, AND CAFFEINE 1 TABLET: 50; 325; 40 TABLET ORAL at 22:53

## 2021-08-12 RX ADMIN — CALCIUM CARBONATE 3 TABLET: 500 TABLET, CHEWABLE ORAL at 17:53

## 2021-08-12 RX ADMIN — BUTALBITAL, ACETAMINOPHEN, AND CAFFEINE 1 TABLET: 50; 325; 40 TABLET ORAL at 17:03

## 2021-08-12 NOTE — PLAN OF CARE
Goal Outcome Evaluation:  Plan of Care Reviewed With: patient, spouse        Progress: improving  Outcome Summary: IV WAS FLUSHED AN D SALINE LOCKED WITH NO REDNESS OR EDEMA NOTED TO SITE.  REACTIVE NST X'S TWICE.   IS AT SIDE AND PT HAS NO C/O'S.

## 2021-08-12 NOTE — H&P
ARELIS Yuan  Obstetric History and Physical    No chief complaint on file.      Subjective     Patient is a 31 y.o. female  currently at 36w3d, who presents from the office today for elevated blood pressure in the mild range.  Patient states she had a mild headache.  She denies scotomata, nausea vomiting or abdominal pain or vaginal bleeding.  She admits to good fetal movement.  Patient 24-hour urine on 731 of 310 mg of protein which rules her in for preeclampsia.  She had a repeat done on  which was 295.  She has 1+ proteinuria today on urinalysis.    Her prenatal care is complicated by  hypertension  pre-eclampsia.  Her previous obstetric/gynecological history is noted for is non-contributory.    The following portions of the patients history were reviewed and updated as appropriate: current medications, allergies, past medical history, past surgical history, past family history, past social history and problem list .       Prenatal Information:   Maternal Prenatal Labs  Blood Type ABO Type   Date Value Ref Range Status   2021 O  Final      Rh Status RH type   Date Value Ref Range Status   2021 Positive  Final      Antibody Screen Antibody Screen   Date Value Ref Range Status   2021 Negative  Final      Rapid Urine Drug Screen No results found for: AMPMETHU, BARBITSCNUR, LABBENZSCN, LABMETHSCN, LABOPIASCN, THCURSCR, COCAINEUR, AMPHETSCREEN, PROPOXSCN, BUPRENORSCNU, METAMPSCNUR, OXYCODONESCN, TRICYCLICSCN   Group B Strep Culture No results found for: GBSANTIGEN           External Prenatal Results     Pregnancy Outside Results - Transcribed From Office Records - See Scanned Records For Details     Test Value Date Time    ABO  O  21 1246    Rh  Positive  21 1246    Antibody Screen  Negative  21 1246      ^ Negative  21     Varicella IgG       Rubella ^ Immune  21     Hgb  13.6 g/dL 21 1246       12.8 g/dL 21 1132    Hct  41.6 % 21 1246       38.6  % 21 1132    Glucose Fasting GTT       Glucose Tolerance Test 1 hour       Glucose Tolerance Test 3 hour       Gonorrhea (discrete) ^ NEG  21     Chlamydia (discrete) ^ NEG  21     RPR ^ Non-Reactive  21     VDRL       Syphilis Antibody       HBsAg ^ Negative  21     Herpes Simplex Virus PCR       Herpes Simplex VIrus Culture       HIV ^ Negative  21     Hep C RNA Quant PCR       Hep C Antibody       AFP       Group B Strep ^ Unknown  21     GBS Susceptibility to Clindamycin       GBS Susceptibility to Erythromycin       Fetal Fibronectin       Genetic Testing, Maternal Blood             Drug Screening     Test Value Date Time    Urine Drug Screen       Amphetamine Screen       Barbiturate Screen       Benzodiazepine Screen       Methadone Screen       Phencyclidine Screen       Opiates Screen       THC Screen       Cocaine Screen       Propoxyphene Screen       Buprenorphine Screen       Methamphetamine Screen       Oxycodone Screen       Tricyclic Antidepressants Screen             Legend    ^: Historical                          Past OB History:     OB History    Para Term  AB Living   1 0 0 0 0 0   SAB TAB Ectopic Molar Multiple Live Births   0 0 0 0 0 0      # Outcome Date GA Lbr [facility]/2nd Weight Sex Delivery Anes PTL Lv   1 Current                Past Medical History: Past Medical History:   Diagnosis Date   • Allergic    • Anxiety    • Heart murmur    • Hypertension affecting pregnancy    • Kidney stone    • Kidney stones    • Otitis media    • Sinusitis    • Urinary tract infection       Past Surgical History Past Surgical History:   Procedure Laterality Date   • CYSTOSCOPY URETEROSCOPY LASER LITHOTRIPSY     • KIDNEY STONE SURGERY Right 2017   • MOUTH SURGERY     • SEPTOPLASTY     • WISDOM TOOTH EXTRACTION        Family History: Family History   Problem Relation Age of Onset   • Heart disease Mother    • Melanoma Mother    • Heart disease Maternal  Grandmother    • Diabetes Maternal Grandmother    • Heart disease Maternal Grandfather    • Melanoma Paternal Grandmother       Social History:  reports that she has never smoked. She has never used smokeless tobacco.   reports previous alcohol use.   reports no history of drug use.        Review of Systems      Objective     Vital Signs Range for the last 24 hours  Temperature: Temp:  [98.7 °F (37.1 °C)] 98.7 °F (37.1 °C)   Temp Source: Temp src: Oral   BP: BP: (137-155)/(81-96) 138/84   Pulse: Heart Rate:  [] 99   Respirations: Resp:  [18] 18   Weight: Weight:  [112 kg (247 lb)] 112 kg (247 lb)     Physical Examination: General appearance - alert, well appearing, and in no distress  Chest - clear to auscultation, no wheezes, rales or rhonchi, symmetric air entry  Heart - normal rate and regular rhythm  Abdomen -soft nondistended nontender gravid uterus  Extremities - no pedal edema noted    Presentation:  Cephalic                         Assessment/Plan       Hypertension affecting pregnancy      Assessment & Plan    Assessment/Plan:  1.  Intrauterine pregnancy at 36w3d weeks gestation with reactive fetal status.    2.  Preeclampsia-blood pressures are in the mild range.  Will give Tylenol or Fioricet to see if headache resolves.  Continue serial blood pressures.  Patient sent over to collect 24-hour urine which has been ordered.  Lab work normal.  Growth scan performed today which is 6 pounds 9 ounces consistent with gestational age.  Amniotic fluid index 14 cm.  3.  GBS unknown collected today in the office.      Osiris Bobo DO  8/12/2021  15:22 EDT

## 2021-08-12 NOTE — NON STRESS TEST
Evi Hare, a  at 36w3d with an JAMES of 2021, Date entered prior to episode creation, was seen at [facility] LABOR DELIVERY for a nonstress test.    No chief complaint on file.      Patient Active Problem List   Diagnosis   • Pregnant   • Pregnancy   • Hypertension affecting pregnancy   • Elevated blood pressure reading       Start Time: 1240  Stop Time: 1300    Interpretation A  Nonstress Test Interpretation A: Reactive (21 1359 : Melissa Rg, RN)  Comments A: DR. STEIN ON UNIT AND REVIEWED STRIP AND VERIFIED REACTIVE NST (21 1359 : Melissa Rg, RN)

## 2021-08-12 NOTE — NON STRESS TEST
Evi Hare, a  at 36w3d with an JAMES of 2021, Date entered prior to episode creation, was seen at [facility] LABOR DELIVERY for a nonstress test.    No chief complaint on file.      Patient Active Problem List   Diagnosis   • Pregnant   • Pregnancy   • Hypertension affecting pregnancy   • Elevated blood pressure reading       Start Time: 1750  Stop Time: 1810      Interpretation A  Nonstress Test Interpretation A: Reactive (21 : Melissa Rg, RN)  Comments A: VERIFIED BY SAMSON GARCIA RN (21 : Melissa Rg, RN)

## 2021-08-13 ENCOUNTER — APPOINTMENT (OUTPATIENT)
Dept: ULTRASOUND IMAGING | Facility: HOSPITAL | Age: 31
End: 2021-08-13

## 2021-08-13 PROBLEM — O15.03 ECLAMPSIA COMPLICATING PREGNANCY, THIRD TRIMESTER: Status: ACTIVE | Noted: 2021-08-13

## 2021-08-13 LAB — PROT 24H UR-MRATE: 356.4 MG/24HOURS (ref 0–150)

## 2021-08-13 PROCEDURE — 76819 FETAL BIOPHYS PROFIL W/O NST: CPT

## 2021-08-13 PROCEDURE — G0378 HOSPITAL OBSERVATION PER HR: HCPCS

## 2021-08-13 PROCEDURE — 25010000002 AMPICILLIN PER 500 MG: Performed by: OBSTETRICS & GYNECOLOGY

## 2021-08-13 PROCEDURE — 76819 FETAL BIOPHYS PROFIL W/O NST: CPT | Performed by: RADIOLOGY

## 2021-08-13 PROCEDURE — 59025 FETAL NON-STRESS TEST: CPT

## 2021-08-13 PROCEDURE — 81050 URINALYSIS VOLUME MEASURE: CPT | Performed by: OBSTETRICS & GYNECOLOGY

## 2021-08-13 PROCEDURE — 84156 ASSAY OF PROTEIN URINE: CPT | Performed by: OBSTETRICS & GYNECOLOGY

## 2021-08-13 RX ORDER — OXYTOCIN-SODIUM CHLORIDE 0.9% IV SOLN 30 UNIT/500ML 30-0.9/5 UT/ML-%
2-20 SOLUTION INTRAVENOUS
Status: DISCONTINUED | OUTPATIENT
Start: 2021-08-14 | End: 2021-08-14 | Stop reason: HOSPADM

## 2021-08-13 RX ORDER — LABETALOL HYDROCHLORIDE 5 MG/ML
INJECTION, SOLUTION INTRAVENOUS
Status: COMPLETED
Start: 2021-08-13 | End: 2021-08-13

## 2021-08-13 RX ORDER — ONDANSETRON 2 MG/ML
4 INJECTION INTRAMUSCULAR; INTRAVENOUS EVERY 6 HOURS PRN
Status: DISCONTINUED | OUTPATIENT
Start: 2021-08-13 | End: 2021-08-14 | Stop reason: HOSPADM

## 2021-08-13 RX ORDER — MINERAL OIL
OIL (ML) MISCELLANEOUS ONCE
Status: DISCONTINUED | OUTPATIENT
Start: 2021-08-13 | End: 2021-08-14 | Stop reason: HOSPADM

## 2021-08-13 RX ORDER — MAGNESIUM HYDROXIDE 1200 MG/15ML
1000 LIQUID ORAL ONCE AS NEEDED
Status: DISCONTINUED | OUTPATIENT
Start: 2021-08-13 | End: 2021-08-14 | Stop reason: HOSPADM

## 2021-08-13 RX ORDER — ONDANSETRON 4 MG/1
4 TABLET, FILM COATED ORAL EVERY 6 HOURS PRN
Status: DISCONTINUED | OUTPATIENT
Start: 2021-08-13 | End: 2021-08-14 | Stop reason: HOSPADM

## 2021-08-13 RX ORDER — ACETAMINOPHEN 325 MG/1
650 TABLET ORAL EVERY 4 HOURS PRN
Status: DISCONTINUED | OUTPATIENT
Start: 2021-08-13 | End: 2021-08-14 | Stop reason: HOSPADM

## 2021-08-13 RX ORDER — BUTORPHANOL TARTRATE 1 MG/ML
1 INJECTION, SOLUTION INTRAMUSCULAR; INTRAVENOUS
Status: DISCONTINUED | OUTPATIENT
Start: 2021-08-13 | End: 2021-08-14 | Stop reason: HOSPADM

## 2021-08-13 RX ORDER — TERBUTALINE SULFATE 1 MG/ML
0.2 INJECTION, SOLUTION SUBCUTANEOUS AS NEEDED
Status: DISCONTINUED | OUTPATIENT
Start: 2021-08-13 | End: 2021-08-14 | Stop reason: HOSPADM

## 2021-08-13 RX ORDER — SODIUM CHLORIDE, SODIUM LACTATE, POTASSIUM CHLORIDE, CALCIUM CHLORIDE 600; 310; 30; 20 MG/100ML; MG/100ML; MG/100ML; MG/100ML
125 INJECTION, SOLUTION INTRAVENOUS CONTINUOUS
Status: DISCONTINUED | OUTPATIENT
Start: 2021-08-13 | End: 2021-08-16 | Stop reason: HOSPADM

## 2021-08-13 RX ORDER — MISOPROSTOL 100 UG/1
25 TABLET ORAL
Status: COMPLETED | OUTPATIENT
Start: 2021-08-13 | End: 2021-08-14

## 2021-08-13 RX ORDER — LABETALOL HYDROCHLORIDE 5 MG/ML
20 INJECTION, SOLUTION INTRAVENOUS ONCE
Status: COMPLETED | OUTPATIENT
Start: 2021-08-13 | End: 2021-08-13

## 2021-08-13 RX ADMIN — LABETALOL HYDROCHLORIDE 20 MG: 5 INJECTION, SOLUTION INTRAVENOUS at 08:45

## 2021-08-13 RX ADMIN — MISOPROSTOL 25 MCG: 100 TABLET ORAL at 22:10

## 2021-08-13 RX ADMIN — HYDROXYZINE HYDROCHLORIDE 50 MG: 50 TABLET ORAL at 22:19

## 2021-08-13 RX ADMIN — SODIUM CHLORIDE, POTASSIUM CHLORIDE, SODIUM LACTATE AND CALCIUM CHLORIDE 125 ML/HR: 600; 310; 30; 20 INJECTION, SOLUTION INTRAVENOUS at 22:05

## 2021-08-13 RX ADMIN — AMPICILLIN SODIUM 2 G: 2 INJECTION, POWDER, FOR SOLUTION INTRAVENOUS at 22:05

## 2021-08-13 NOTE — NON STRESS TEST
Evi Hare, a  at 36w4d with an JAMES of 2021, Date entered prior to episode creation, was seen at [facility] LABOR DELIVERY for a nonstress test.    No chief complaint on file.      Patient Active Problem List   Diagnosis   • Pregnant   • Pregnancy   • Hypertension affecting pregnancy   • Elevated blood pressure reading   • Eclampsia complicating pregnancy, third trimester       Start Time:   Stop Time:     Interpretation A  Nonstress Test Interpretation A: Reactive (21 : Meena Summers, RN)  Comments A: verified by tico cadena rn (21 : Meena Summers, RN)

## 2021-08-13 NOTE — NON STRESS TEST
Evi Hare, a  at 36w4d with an JAMES of 2021, Date entered prior to episode creation, was seen at [facility] LABOR DELIVERY for a nonstress test.    No chief complaint on file.      Patient Active Problem List   Diagnosis   • Pregnant   • Pregnancy   • Hypertension affecting pregnancy   • Elevated blood pressure reading   • Eclampsia complicating pregnancy, third trimester       Start Time: 0840  Stop Time: 0910

## 2021-08-13 NOTE — H&P
Chief complaint Pre Eclampsia. PUPPS. GHTN    History of Present Illness: Patient is a 31 y.o. female who presents with IUP at 36w4d weeks gestation.  G 1, P0. Pre Eclampsia. GHTN. PUPPS      Past Medical History:   Diagnosis Date   • Allergic    • Anxiety    • Heart murmur    • Hypertension affecting pregnancy    • Kidney stone    • Kidney stones    • Otitis media    • Sinusitis    • Urinary tract infection      Blood Type: O   Fetal Gender: Female    Past Surgical History:   Procedure Laterality Date   • CYSTOSCOPY URETEROSCOPY LASER LITHOTRIPSY     • KIDNEY STONE SURGERY Right 2017   • MOUTH SURGERY     • SEPTOPLASTY     • WISDOM TOOTH EXTRACTION       Family History   Problem Relation Age of Onset   • Heart disease Mother    • Melanoma Mother    • Heart disease Maternal Grandmother    • Diabetes Maternal Grandmother    • Heart disease Maternal Grandfather    • Melanoma Paternal Grandmother      Social History     Tobacco Use   • Smoking status: Never Smoker   • Smokeless tobacco: Never Used   Substance Use Topics   • Alcohol use: Not Currently   • Drug use: No     Medications Prior to Admission   Medication Sig Dispense Refill Last Dose   • busPIRone (BUSPAR) 5 MG tablet Take 5 mg by mouth 2 (Two) Times a Day.   8/12/2021 at Unknown time   • cetirizine (zyrTEC) 10 MG tablet Take 1 tablet by mouth Daily. 30 tablet 0 8/12/2021 at Unknown time   • hydrOXYzine (ATARAX) 25 MG tablet Take 1 tablet by mouth Every 6 (Six) Hours As Needed for Itching. 30 tablet 0 Past Month at Unknown time   • omeprazole (priLOSEC) 20 MG capsule Take 20 mg by mouth Daily.   8/12/2021 at Unknown time   • Prenatal Vit-Fe Fumarate-FA (prenatal vitamin 27-0.8) 27-0.8 MG tablet tablet Take 1 tablet by mouth Daily.   8/12/2021 at Unknown time     Allergies:  Cinnamon and Red dye      Vital Signs  Temp:  [98.2 °F (36.8 °C)-98.7 °F (37.1 °C)] 98.2 °F (36.8 °C)  Heart Rate:  [] 98  Resp:  [18] 18  BP: (128-162)/()  137/84    Radiology  Imaging Results (Last 24 Hours)     Procedure Component Value Units Date/Time    US Fetal Biophysical Profile;Without Non-Stress Testing [[id number]] Collected: 08/12/21 1443     Updated: 08/12/21 1449    Narrative:      US FETAL BIOPHYSICAL PROFILE;WITHOUT NON-STRESS TESTING-     REASON FOR EXAM:  romie level and htn     Comparison:Previous ultrasound from June.     Multiple real-time images were obtained. A single intrauterine pregnancy  was demonstrated. Fetal cardiac activity was measured at 150 bpm.  Amniotic fluid volume was appropriate. The index measured 14. The  following gestational age parameters were measured.  BPD 9.21 cm, 37 weeks 3 days.  Head circumference 31.8 cm, 35 weeks 6 days.  Abdominal circumference 33.04 cm, 37 weeks.  Femur length 6.96 cm, 35 weeks 5 days.     These would give an estimated gestational age by ultrasound of 35 weeks  and 6 days correlating well with LMP history dating. The biophysical  profile score was 8 out of 8 with fetal movement, tone, breathing and  amniotic fluid score at 2.       Impression:      Single intrauterine pregnancy in cephalic orientation. The  estimated fetal age is 35 weeks and 6 days. The biophysical profile  score is 8 out of 8.     This report was finalized on 8/12/2021 2:47 PM by Dr. Geovany Hinojosa II, MD.             Labs  Lab Results (last 24 hours)     Procedure Component Value Units Date/Time    Urinalysis without microscopic (no culture) - Urine, Clean Catch [[id number]]  (Abnormal) Collected: 08/12/21 1224    Specimen: Urine, Clean Catch Updated: 08/12/21 1415     Color, UA Yellow     Appearance, UA Cloudy     pH, UA 6.0     Specific Gravity, UA 1.011     Glucose, UA Negative     Ketones, UA Negative     Bilirubin, UA Negative     Blood, UA Negative     Protein, UA 30 mg/dL (1+)     Leuk Esterase, UA Trace     Nitrite, UA Negative     Urobilinogen, UA 0.2 E.U./dL    Group B Streptococcus Culture - Swab, Vaginal/Rectum  [[id number]] Resulted: 08/12/21    Specimen: Swab from Vaginal/Rectum Updated: 08/12/21 1400     External Strep Group B Ag Unknown    Comprehensive Metabolic Panel [[id number]]  (Abnormal) Collected: 08/12/21 1246    Specimen: Blood Updated: 08/12/21 1322     Glucose 121 mg/dL      BUN 8 mg/dL      Creatinine 0.54 mg/dL      Sodium 134 mmol/L      Potassium 4.0 mmol/L      Chloride 104 mmol/L      CO2 20.9 mmol/L      Calcium 9.9 mg/dL      Total Protein 5.7 g/dL      Albumin 3.05 g/dL      ALT (SGPT) 11 U/L      AST (SGOT) 13 U/L      Alkaline Phosphatase 192 U/L      Total Bilirubin 0.2 mg/dL      eGFR Non African Amer 133 mL/min/1.73      Globulin 2.7 gm/dL      A/G Ratio 1.2 g/dL      BUN/Creatinine Ratio 14.8     Anion Gap 9.1 mmol/L     Narrative:      GFR Normal >60  Chronic Kidney Disease <60  Kidney Failure <15      Uric Acid [[id number]]  (Normal) Collected: 08/12/21 1246    Specimen: Blood Updated: 08/12/21 1322     Uric Acid 5.3 mg/dL     CBC (No Diff) [[id number]]  (Normal) Collected: 08/12/21 1246    Specimen: Blood Updated: 08/12/21 1305     WBC 10.77 10*3/mm3      RBC 4.35 10*6/mm3      Hemoglobin 13.6 g/dL      Hematocrit 41.6 %      MCV 95.6 fL      MCH 31.3 pg      MCHC 32.7 g/dL      RDW 13.3 %      RDW-SD 47.2 fl      MPV 10.1 fL      Platelets 221 10*3/mm3     COVID PRE-OP / PRE-PROCEDURE SCREENING ORDER (NO ISOLATION) - Swab, Nasopharynx [[id number]]  (Normal) Collected: 08/12/21 1225    Specimen: Swab from Nasopharynx Updated: 08/12/21 1300    Narrative:      The following orders were created for panel order COVID PRE-OP / PRE-PROCEDURE SCREENING ORDER (NO ISOLATION) - Swab, Nasopharynx.  Procedure                               Abnormality         Status                     ---------                               -----------         ------                     COVID-19 and FLU A/B PCR...[[id number]]  Normal              Final result                 Please view results for these tests on the  individual orders.    COVID-19 and FLU A/B PCR - Swab, Nasopharynx [[id number]]  (Normal) Collected: 08/12/21 1225    Specimen: Swab from Nasopharynx Updated: 08/12/21 1300     COVID19 Not Detected     Influenza A PCR Not Detected     Influenza B PCR Not Detected    Narrative:      Fact sheet for providers: https://www.fda.gov/media/773540/download    Fact sheet for patients: https://www.fda.gov/media/339930/download    Test performed by PCR.    Protein, Urine, Random - Urine, Clean Catch [[id number]] Collected: 08/12/21 1224    Specimen: Urine, Clean Catch Updated: 08/12/21 1259     Total Protein, Urine 27.4 mg/dL     Narrative:      Reference intervals for random urine have not been established.  Clinical usage is dependent upon physician's interpretation in combination with other laboratory tests.               Review of Systems    The following systems were reviewed and negative;  ENT, respiratory, cardiovascular, gastrointestinal, genitourinary, breast, endocrine and allergies / immunologic.      Physical Exam:      General Appearance:    Alert, cooperative, in no acute distress   Head:    Normocephalic, without obvious abnormality, atraumatic   Eyes:            Lids and lashes normal, conjunctivae and sclerae normal, no   icterus, no pallor, corneas clear, PERRLA   Ears:    Ears appear intact with no abnormalities noted   Throat:   No oral lesions, no thrush, oral mucosa moist   Neck:   No adenopathy, supple, trachea midline, no thyromegaly, no     carotid bruit, no JVD   Back:     No kyphosis present, no scoliosis present, no skin lesions,       erythema or scars, no tenderness to percussion or                   palpation,   range of motion normal   Lungs:     Clear to auscultation,respirations regular, even and                   unlabored    Heart:    Regular rhythm and normal rate, normal S1 and S2, no            murmur, no gallop, no rub, no click   Breast Exam:    Deferred   Abdomen:     Normal bowel  sounds, no masses, no organomegaly, soft        non-tender, non-distended, no guarding, no rebound                 tenderness   Genitalia:    Deferred   Extremities:   Moves all extremities well, no edema, no cyanosis, no              redness   Pulses:   Pulses palpable and equal bilaterally   Skin:   No bleeding, bruising or rash   Lymph nodes:   No palpable adenopathy   Neurologic:   Cranial nerves 2 - 12 grossly intact, sensation intact, DTR        present and equal bilaterally         Assessment: Patient is a 31 y.o. female who presents with IUP at 36w4d weeks gestation. G 1, P0. Pre Eclampsia. GHTN. PUPPS    Plan of Care: Admit. Proceed with IOL. GBS Profilaxis      Richard Dumont III, MD  08/13/21  08:59 EDT

## 2021-08-13 NOTE — PLAN OF CARE
Goal Outcome Evaluation:              Outcome Summary: BP STABLE DURING THE NIGHT, ELEVATED THIS AM. DENIES PAIN, NEEDS OR C/O'S. 24 HR URINE IN PROGRESS.

## 2021-08-13 NOTE — PLAN OF CARE
Goal Outcome Evaluation:  Plan of Care Reviewed With: patient         Labetalol IV given x 1 with stable bp since. Pt denies c/o's. Plan for induction tonight.

## 2021-08-14 ENCOUNTER — ANESTHESIA EVENT (OUTPATIENT)
Dept: LABOR AND DELIVERY | Facility: HOSPITAL | Age: 31
End: 2021-08-14

## 2021-08-14 ENCOUNTER — ANESTHESIA (OUTPATIENT)
Dept: LABOR AND DELIVERY | Facility: HOSPITAL | Age: 31
End: 2021-08-14

## 2021-08-14 PROCEDURE — 25010000002 KETOROLAC TROMETHAMINE PER 15 MG: Performed by: OBSTETRICS & GYNECOLOGY

## 2021-08-14 PROCEDURE — 25010000002 MORPHINE PER 10 MG: Performed by: NURSE ANESTHETIST, CERTIFIED REGISTERED

## 2021-08-14 PROCEDURE — 25010000002 BUTORPHANOL PER 1 MG: Performed by: OBSTETRICS & GYNECOLOGY

## 2021-08-14 PROCEDURE — C1755 CATHETER, INTRASPINAL: HCPCS

## 2021-08-14 PROCEDURE — 25010000002 AMPICILLIN PER 500 MG: Performed by: OBSTETRICS & GYNECOLOGY

## 2021-08-14 PROCEDURE — C1755 CATHETER, INTRASPINAL: HCPCS | Performed by: ANESTHESIOLOGY

## 2021-08-14 PROCEDURE — 25010000003 CEFAZOLIN SODIUM-DEXTROSE 2-3 GM-%(50ML) RECONSTITUTED SOLUTION: Performed by: OBSTETRICS & GYNECOLOGY

## 2021-08-14 RX ORDER — CARBOPROST TROMETHAMINE 250 UG/ML
INJECTION, SOLUTION INTRAMUSCULAR
Status: DISCONTINUED
Start: 2021-08-14 | End: 2021-08-16 | Stop reason: HOSPADM

## 2021-08-14 RX ORDER — OXYTOCIN-SODIUM CHLORIDE 0.9% IV SOLN 30 UNIT/500ML 30-0.9/5 UT/ML-%
999 SOLUTION INTRAVENOUS ONCE
Status: DISCONTINUED | OUTPATIENT
Start: 2021-08-14 | End: 2021-08-14 | Stop reason: SDUPTHER

## 2021-08-14 RX ORDER — KETOROLAC TROMETHAMINE 30 MG/ML
30 INJECTION, SOLUTION INTRAMUSCULAR; INTRAVENOUS EVERY 6 HOURS PRN
Status: DISPENSED | OUTPATIENT
Start: 2021-08-14 | End: 2021-08-15

## 2021-08-14 RX ORDER — TRANEXAMIC ACID 100 MG/ML
INJECTION, SOLUTION INTRAVENOUS AS NEEDED
Status: DISCONTINUED | OUTPATIENT
Start: 2021-08-14 | End: 2021-08-14 | Stop reason: SURG

## 2021-08-14 RX ORDER — CETIRIZINE HYDROCHLORIDE 10 MG/1
10 TABLET ORAL DAILY
Status: DISCONTINUED | OUTPATIENT
Start: 2021-08-15 | End: 2021-08-16 | Stop reason: HOSPADM

## 2021-08-14 RX ORDER — PROMETHAZINE HYDROCHLORIDE 12.5 MG/1
12.5 SUPPOSITORY RECTAL EVERY 6 HOURS PRN
Status: DISCONTINUED | OUTPATIENT
Start: 2021-08-14 | End: 2021-08-14 | Stop reason: HOSPADM

## 2021-08-14 RX ORDER — CARBOPROST TROMETHAMINE 250 UG/ML
250 INJECTION, SOLUTION INTRAMUSCULAR AS NEEDED
Status: DISCONTINUED | OUTPATIENT
Start: 2021-08-14 | End: 2021-08-14 | Stop reason: HOSPADM

## 2021-08-14 RX ORDER — METHYLERGONOVINE MALEATE 0.2 MG/ML
200 INJECTION INTRAVENOUS ONCE AS NEEDED
Status: DISCONTINUED | OUTPATIENT
Start: 2021-08-14 | End: 2021-08-14 | Stop reason: HOSPADM

## 2021-08-14 RX ORDER — OXYTOCIN-SODIUM CHLORIDE 0.9% IV SOLN 30 UNIT/500ML 30-0.9/5 UT/ML-%
250 SOLUTION INTRAVENOUS CONTINUOUS
Status: ACTIVE | OUTPATIENT
Start: 2021-08-14 | End: 2021-08-14

## 2021-08-14 RX ORDER — IBUPROFEN 800 MG/1
800 TABLET ORAL EVERY 8 HOURS SCHEDULED
Status: DISCONTINUED | OUTPATIENT
Start: 2021-08-14 | End: 2021-08-14 | Stop reason: HOSPADM

## 2021-08-14 RX ORDER — MISOPROSTOL 100 UG/1
600 TABLET ORAL AS NEEDED
Status: DISCONTINUED | OUTPATIENT
Start: 2021-08-14 | End: 2021-08-14

## 2021-08-14 RX ORDER — OXYTOCIN-SODIUM CHLORIDE 0.9% IV SOLN 30 UNIT/500ML 30-0.9/5 UT/ML-%
125 SOLUTION INTRAVENOUS CONTINUOUS PRN
Status: DISCONTINUED | OUTPATIENT
Start: 2021-08-14 | End: 2021-08-14 | Stop reason: SDUPTHER

## 2021-08-14 RX ORDER — OXYTOCIN-SODIUM CHLORIDE 0.9% IV SOLN 30 UNIT/500ML 30-0.9/5 UT/ML-%
999 SOLUTION INTRAVENOUS ONCE
Status: DISCONTINUED | OUTPATIENT
Start: 2021-08-14 | End: 2021-08-16 | Stop reason: HOSPADM

## 2021-08-14 RX ORDER — ROPIVACAINE HYDROCHLORIDE 2 MG/ML
14 INJECTION, SOLUTION EPIDURAL; INFILTRATION; PERINEURAL CONTINUOUS
Status: DISCONTINUED | OUTPATIENT
Start: 2021-08-14 | End: 2021-08-16 | Stop reason: HOSPADM

## 2021-08-14 RX ORDER — PROMETHAZINE HYDROCHLORIDE 25 MG/1
12.5 TABLET ORAL EVERY 6 HOURS PRN
Status: DISCONTINUED | OUTPATIENT
Start: 2021-08-14 | End: 2021-08-14 | Stop reason: HOSPADM

## 2021-08-14 RX ORDER — BUSPIRONE HYDROCHLORIDE 5 MG/1
5 TABLET ORAL 2 TIMES DAILY
Status: DISCONTINUED | OUTPATIENT
Start: 2021-08-14 | End: 2021-08-16 | Stop reason: HOSPADM

## 2021-08-14 RX ORDER — MISOPROSTOL 100 UG/1
600 TABLET ORAL AS NEEDED
Status: DISCONTINUED | OUTPATIENT
Start: 2021-08-14 | End: 2021-08-14 | Stop reason: HOSPADM

## 2021-08-14 RX ORDER — SODIUM CHLORIDE, SODIUM LACTATE, POTASSIUM CHLORIDE, CALCIUM CHLORIDE 600; 310; 30; 20 MG/100ML; MG/100ML; MG/100ML; MG/100ML
125 INJECTION, SOLUTION INTRAVENOUS CONTINUOUS
Status: DISCONTINUED | OUTPATIENT
Start: 2021-08-14 | End: 2021-08-16 | Stop reason: HOSPADM

## 2021-08-14 RX ORDER — IBUPROFEN 800 MG/1
800 TABLET ORAL EVERY 8 HOURS SCHEDULED
Status: DISCONTINUED | OUTPATIENT
Start: 2021-08-14 | End: 2021-08-16 | Stop reason: HOSPADM

## 2021-08-14 RX ORDER — FAMOTIDINE 10 MG/ML
INJECTION, SOLUTION INTRAVENOUS
Status: COMPLETED
Start: 2021-08-14 | End: 2021-08-14

## 2021-08-14 RX ORDER — FENTANYL CIT 0.2 MG/100ML-ROPIV 0.2%-NACL 0.9% EPIDURAL INJ 2/0.2 MCG/ML-%
SOLUTION INJECTION CONTINUOUS PRN
Status: DISCONTINUED | OUTPATIENT
Start: 2021-08-14 | End: 2021-08-14 | Stop reason: SURG

## 2021-08-14 RX ORDER — NALOXONE HCL 0.4 MG/ML
0.1 VIAL (ML) INJECTION
Status: ACTIVE | OUTPATIENT
Start: 2021-08-14 | End: 2021-08-15

## 2021-08-14 RX ORDER — METHYLERGONOVINE MALEATE 0.2 MG/ML
200 INJECTION INTRAVENOUS AS NEEDED
Status: DISCONTINUED | OUTPATIENT
Start: 2021-08-14 | End: 2021-08-14 | Stop reason: HOSPADM

## 2021-08-14 RX ORDER — ACETAMINOPHEN 325 MG/1
650 TABLET ORAL EVERY 4 HOURS PRN
Status: DISCONTINUED | OUTPATIENT
Start: 2021-08-14 | End: 2021-08-14 | Stop reason: HOSPADM

## 2021-08-14 RX ORDER — SODIUM CHLORIDE 0.9 % (FLUSH) 0.9 %
3-10 SYRINGE (ML) INJECTION AS NEEDED
Status: DISCONTINUED | OUTPATIENT
Start: 2021-08-14 | End: 2021-08-14 | Stop reason: HOSPADM

## 2021-08-14 RX ORDER — MORPHINE SULFATE 1 MG/ML
INJECTION, SOLUTION EPIDURAL; INTRATHECAL; INTRAVENOUS AS NEEDED
Status: DISCONTINUED | OUTPATIENT
Start: 2021-08-14 | End: 2021-08-14 | Stop reason: SURG

## 2021-08-14 RX ORDER — OXYCODONE HYDROCHLORIDE AND ACETAMINOPHEN 5; 325 MG/1; MG/1
1 TABLET ORAL EVERY 4 HOURS PRN
Status: DISCONTINUED | OUTPATIENT
Start: 2021-08-14 | End: 2021-08-16 | Stop reason: HOSPADM

## 2021-08-14 RX ORDER — LIDOCAINE HYDROCHLORIDE 20 MG/ML
INJECTION, SOLUTION INFILTRATION; PERINEURAL AS NEEDED
Status: DISCONTINUED | OUTPATIENT
Start: 2021-08-14 | End: 2021-08-14 | Stop reason: SURG

## 2021-08-14 RX ORDER — MAGNESIUM HYDROXIDE 1200 MG/15ML
3000 LIQUID ORAL ONCE AS NEEDED
Status: DISCONTINUED | OUTPATIENT
Start: 2021-08-14 | End: 2021-08-14 | Stop reason: HOSPADM

## 2021-08-14 RX ORDER — SIMETHICONE 80 MG
80 TABLET,CHEWABLE ORAL 4 TIMES DAILY PRN
Status: DISCONTINUED | OUTPATIENT
Start: 2021-08-14 | End: 2021-08-16 | Stop reason: HOSPADM

## 2021-08-14 RX ORDER — EPHEDRINE SULFATE 50 MG/ML
10 INJECTION, SOLUTION INTRAVENOUS
Status: DISCONTINUED | OUTPATIENT
Start: 2021-08-14 | End: 2021-08-14 | Stop reason: HOSPADM

## 2021-08-14 RX ORDER — CEFAZOLIN SODIUM 2 G/50ML
2 SOLUTION INTRAVENOUS ONCE
Status: COMPLETED | OUTPATIENT
Start: 2021-08-14 | End: 2021-08-14

## 2021-08-14 RX ORDER — LABETALOL 200 MG/1
200 TABLET, FILM COATED ORAL EVERY 8 HOURS SCHEDULED
Status: DISCONTINUED | OUTPATIENT
Start: 2021-08-14 | End: 2021-08-16 | Stop reason: HOSPADM

## 2021-08-14 RX ORDER — OXYCODONE AND ACETAMINOPHEN 10; 325 MG/1; MG/1
1 TABLET ORAL EVERY 4 HOURS PRN
Status: DISCONTINUED | OUTPATIENT
Start: 2021-08-14 | End: 2021-08-16 | Stop reason: HOSPADM

## 2021-08-14 RX ORDER — ONDANSETRON 2 MG/ML
4 INJECTION INTRAMUSCULAR; INTRAVENOUS EVERY 6 HOURS PRN
Status: DISCONTINUED | OUTPATIENT
Start: 2021-08-14 | End: 2021-08-16 | Stop reason: HOSPADM

## 2021-08-14 RX ORDER — ONDANSETRON 4 MG/1
4 TABLET, FILM COATED ORAL EVERY 8 HOURS PRN
Status: DISCONTINUED | OUTPATIENT
Start: 2021-08-14 | End: 2021-08-16 | Stop reason: HOSPADM

## 2021-08-14 RX ORDER — MISOPROSTOL 100 UG/1
800 TABLET ORAL AS NEEDED
Status: DISCONTINUED | OUTPATIENT
Start: 2021-08-14 | End: 2021-08-14 | Stop reason: HOSPADM

## 2021-08-14 RX ORDER — MISOPROSTOL 100 UG/1
25 TABLET ORAL EVERY 4 HOURS PRN
Status: DISCONTINUED | OUTPATIENT
Start: 2021-08-14 | End: 2021-08-16 | Stop reason: HOSPADM

## 2021-08-14 RX ORDER — LANOLIN 100 %
OINTMENT (GRAM) TOPICAL
Status: DISCONTINUED | OUTPATIENT
Start: 2021-08-14 | End: 2021-08-16 | Stop reason: HOSPADM

## 2021-08-14 RX ORDER — MORPHINE SULFATE 2 MG/ML
2 INJECTION, SOLUTION INTRAMUSCULAR; INTRAVENOUS
Status: DISCONTINUED | OUTPATIENT
Start: 2021-08-14 | End: 2021-08-16 | Stop reason: HOSPADM

## 2021-08-14 RX ORDER — OXYTOCIN 10 [USP'U]/ML
INJECTION, SOLUTION INTRAMUSCULAR; INTRAVENOUS AS NEEDED
Status: DISCONTINUED | OUTPATIENT
Start: 2021-08-14 | End: 2021-08-14 | Stop reason: SURG

## 2021-08-14 RX ORDER — OXYTOCIN-SODIUM CHLORIDE 0.9% IV SOLN 30 UNIT/500ML 30-0.9/5 UT/ML-%
250 SOLUTION INTRAVENOUS CONTINUOUS
Status: DISCONTINUED | OUTPATIENT
Start: 2021-08-14 | End: 2021-08-14 | Stop reason: SDUPTHER

## 2021-08-14 RX ORDER — PRENATAL VIT/IRON FUM/FOLIC AC 27MG-0.8MG
1 TABLET ORAL DAILY
Status: DISCONTINUED | OUTPATIENT
Start: 2021-08-15 | End: 2021-08-16 | Stop reason: HOSPADM

## 2021-08-14 RX ORDER — OXYTOCIN-SODIUM CHLORIDE 0.9% IV SOLN 30 UNIT/500ML 30-0.9/5 UT/ML-%
125 SOLUTION INTRAVENOUS CONTINUOUS PRN
Status: DISCONTINUED | OUTPATIENT
Start: 2021-08-14 | End: 2021-08-16 | Stop reason: HOSPADM

## 2021-08-14 RX ORDER — FENTANYL CIT 0.2 MG/100ML-ROPIV 0.2%-NACL 0.9% EPIDURAL INJ 2/0.2 MCG/ML-%
SOLUTION INJECTION
Status: COMPLETED
Start: 2021-08-14 | End: 2021-08-14

## 2021-08-14 RX ORDER — ONDANSETRON 4 MG/1
4 TABLET, FILM COATED ORAL EVERY 6 HOURS PRN
Status: DISCONTINUED | OUTPATIENT
Start: 2021-08-14 | End: 2021-08-14 | Stop reason: HOSPADM

## 2021-08-14 RX ORDER — ONDANSETRON 2 MG/ML
4 INJECTION INTRAMUSCULAR; INTRAVENOUS EVERY 6 HOURS PRN
Status: DISCONTINUED | OUTPATIENT
Start: 2021-08-14 | End: 2021-08-14 | Stop reason: HOSPADM

## 2021-08-14 RX ORDER — BISACODYL 10 MG
10 SUPPOSITORY, RECTAL RECTAL DAILY PRN
Status: DISCONTINUED | OUTPATIENT
Start: 2021-08-14 | End: 2021-08-16 | Stop reason: HOSPADM

## 2021-08-14 RX ORDER — ONDANSETRON 2 MG/ML
4 INJECTION INTRAMUSCULAR; INTRAVENOUS ONCE AS NEEDED
Status: DISCONTINUED | OUTPATIENT
Start: 2021-08-14 | End: 2021-08-14 | Stop reason: HOSPADM

## 2021-08-14 RX ORDER — MAGNESIUM HYDROXIDE 1200 MG/15ML
LIQUID ORAL AS NEEDED
Status: DISCONTINUED | OUTPATIENT
Start: 2021-08-14 | End: 2021-08-16 | Stop reason: HOSPADM

## 2021-08-14 RX ADMIN — OXYTOCIN 40 UNITS: 10 INJECTION, SOLUTION INTRAMUSCULAR; INTRAVENOUS at 14:55

## 2021-08-14 RX ADMIN — AMPICILLIN SODIUM 1 G: 1 INJECTION, POWDER, FOR SOLUTION INTRAMUSCULAR; INTRAVENOUS at 01:48

## 2021-08-14 RX ADMIN — LIDOCAINE HYDROCHLORIDE 5 ML: 20 INJECTION, SOLUTION INFILTRATION; PERINEURAL at 14:39

## 2021-08-14 RX ADMIN — LIDOCAINE HYDROCHLORIDE 5 ML: 20 INJECTION, SOLUTION INFILTRATION; PERINEURAL at 14:48

## 2021-08-14 RX ADMIN — MISOPROSTOL 25 MCG: 100 TABLET ORAL at 02:00

## 2021-08-14 RX ADMIN — Medication: at 17:41

## 2021-08-14 RX ADMIN — AMPICILLIN SODIUM 1 G: 1 INJECTION, POWDER, FOR SOLUTION INTRAMUSCULAR; INTRAVENOUS at 09:38

## 2021-08-14 RX ADMIN — HYDROXYZINE HYDROCHLORIDE 50 MG: 50 TABLET ORAL at 21:48

## 2021-08-14 RX ADMIN — FAMOTIDINE 20 MG: 10 INJECTION INTRAVENOUS at 02:12

## 2021-08-14 RX ADMIN — OXYCODONE HYDROCHLORIDE AND ACETAMINOPHEN 1 TABLET: 10; 325 TABLET ORAL at 23:21

## 2021-08-14 RX ADMIN — SODIUM CHLORIDE, POTASSIUM CHLORIDE, SODIUM LACTATE AND CALCIUM CHLORIDE 125 ML/HR: 600; 310; 30; 20 INJECTION, SOLUTION INTRAVENOUS at 05:54

## 2021-08-14 RX ADMIN — AMPICILLIN SODIUM 1 G: 1 INJECTION, POWDER, FOR SOLUTION INTRAMUSCULAR; INTRAVENOUS at 05:54

## 2021-08-14 RX ADMIN — FENTANYL CIT 0.2 MG/100ML-ROPIV 0.2%-NACL 0.9% EPIDURAL INJ 12 ML/HR: 2/0.2 SOLUTION at 12:20

## 2021-08-14 RX ADMIN — CEFAZOLIN SODIUM 2 G: 2 SOLUTION INTRAVENOUS at 14:38

## 2021-08-14 RX ADMIN — LABETALOL HYDROCHLORIDE 200 MG: 200 TABLET, FILM COATED ORAL at 18:23

## 2021-08-14 RX ADMIN — LIDOCAINE HYDROCHLORIDE 5 ML: 20 INJECTION, SOLUTION INFILTRATION; PERINEURAL at 14:56

## 2021-08-14 RX ADMIN — TRANEXAMIC ACID 1000 MG: 100 INJECTION, SOLUTION INTRAVENOUS at 15:08

## 2021-08-14 RX ADMIN — MISOPROSTOL 800 MCG: 100 TABLET ORAL at 15:35

## 2021-08-14 RX ADMIN — LIDOCAINE HYDROCHLORIDE 5 ML: 20 INJECTION, SOLUTION INFILTRATION; PERINEURAL at 14:41

## 2021-08-14 RX ADMIN — KETOROLAC TROMETHAMINE 30 MG: 30 INJECTION, SOLUTION INTRAMUSCULAR; INTRAVENOUS at 17:41

## 2021-08-14 RX ADMIN — MORPHINE SULFATE 3 MG: 1 INJECTION, SOLUTION EPIDURAL; INTRATHECAL; INTRAVENOUS at 14:56

## 2021-08-14 RX ADMIN — OXYTOCIN 2 MILLI-UNITS/MIN: 10 INJECTION, SOLUTION INTRAMUSCULAR; INTRAVENOUS at 12:08

## 2021-08-14 RX ADMIN — BUTORPHANOL TARTRATE 2 MG: 2 INJECTION, SOLUTION INTRAMUSCULAR; INTRAVENOUS at 06:27

## 2021-08-14 RX ADMIN — FENTANYL CIT 0.2 MG/100ML-ROPIV 0.2%-NACL 0.9% EPIDURAL INJ 12 ML/HR: 2/0.2 SOLUTION at 13:13

## 2021-08-14 RX ADMIN — MORPHINE SULFATE 2 MG: 1 INJECTION, SOLUTION EPIDURAL; INTRATHECAL; INTRAVENOUS at 15:00

## 2021-08-14 RX ADMIN — BUTORPHANOL TARTRATE 2 MG: 2 INJECTION, SOLUTION INTRAMUSCULAR; INTRAVENOUS at 09:34

## 2021-08-14 RX ADMIN — MISOPROSTOL 25 MCG: 100 TABLET ORAL at 06:04

## 2021-08-14 NOTE — ANESTHESIA PROCEDURE NOTES
Labor Epidural    Pre-sedation assessment completed: 8/14/2021 12:04 PM    Patient reassessed immediately prior to procedure    Patient location during procedure: OB  Start Time: 8/14/2021 12:04 PM  Stop Time: 8/14/2021 12:20 PM  Indication:at surgeon's request  Performed By  Anesthesiologist: Thai Rosales MD  Preanesthetic Checklist  Completed: patient identified, IV checked, site marked, risks and benefits discussed, surgical consent, monitors and equipment checked, pre-op evaluation and timeout performed  Prep:  Pt Position:sitting  Sterile Tech:cap, gloves, gown, mask and sterile barrier  Prep:povidone-iodine 7.5% surgical scrub  Monitoring:continuous pulse oximetry  Epidural Block Procedure:  Approach:midline  Guidance:palpation technique  Location:L3-L4  Needle Type:Tuohy  Needle Gauge:20 G  Loss of Resistance Medium: saline  Loss of Resistance: 7cm  Cath Depth at skin:9 cm  Paresthesia: none  Aspiration:negative  Test Dose:negative  Med administered at 8/14/2021 12:15 PM  Number of Attempts: 1  Post Assessment:  Dressing:biopatch applied  Pt Tolerance:patient tolerated the procedure well with no apparent complications  Complications:no

## 2021-08-14 NOTE — INTERVAL H&P NOTE
Fetal Bradycardia remote from delivery. Discussed the need for STAT  section. Discussed the risks and benefits. H&P reviewed.  The patient was examined and there are no changes to the H&P

## 2021-08-14 NOTE — NON STRESS TEST
Evi Hare, a  at 36w4d with an JAMES of 2021, Date entered prior to episode creation, was seen at [facility] LABOR DELIVERY for a nonstress test.    No chief complaint on file.      Patient Active Problem List   Diagnosis   • Pregnant   • Pregnancy   • Hypertension affecting pregnancy   • Elevated blood pressure reading   • Eclampsia complicating pregnancy, third trimester       Start Time:   Stop Time:      reactive- verified by KAREN Hicks RN

## 2021-08-14 NOTE — L&D DELIVERY NOTE
Primary Low Transverse  Section Operation Note    Evi Hare  2021    Surgeon: Dr. Dumont    Pre-op Diagnosis:   Pre Eclampsia. GHTN. Fetal Cosme cardia remote from delivery    Post-op Diagnosis:     Same    Procedure(s):   SECTION PRIMARY     Surgeon(s):  Richard Dumont III, MD    Anesthesia: Epidural    Staff: Circulator: Angelina Myers RN  Baby Nurse: Angelique Olson RN  Assistant: Joann Connell  OB TECH: Torri Pleitez PCT    Estimated Blood Loss: 500cc  Complications: None    Grafts and Implants: NA    Procedure     The patient was prepped and draped in the normal sterile fashion. A Pfannenstiel skin incision was made with the knife and carried down through the underlying fascia. The fascia was nicked in the midline and extended laterally. The peritoneum was entered. The bladder blade was placed. Bladder flaps were created. The uterine incision was made with the knife. The infant was delivered in atraumatic fashion. The nares and mouth was bulb suctioned. Cord was clamped times 2 and cut. The placenta was delivered intact. The uterus was closed with #1 chromic in a running locking fashion. The fascia was closed with 0 Dexon. The skin was closed with 4-0 Monocryl. The patient tolerated the procedure well and went to the recovery room in stable condition.       APGARS  8 & 9    GENDER  F      Richard Dumont III, MD     Date: 2021  Time: 17:27 EDT

## 2021-08-14 NOTE — ANESTHESIA PREPROCEDURE EVALUATION
Anesthesia Evaluation     Patient summary reviewed and Nursing notes reviewed   NPO Solid Status: > 8 hours  NPO Liquid Status: > 8 hours           Airway   Mallampati: II  TM distance: >3 FB  Neck ROM: full  No difficulty expected  Dental - normal exam     Pulmonary     breath sounds clear to auscultation  Cardiovascular   Exercise tolerance: good (4-7 METS)    Rate: normal    (+) hypertension, valvular problems/murmurs,       Neuro/Psych  (+) seizures, psychiatric history,     GI/Hepatic/Renal/Endo    (+)   renal disease,     Musculoskeletal     Abdominal     Abdomen: soft.   Substance History      OB/GYN    (+) Pregnant,         Other                        Anesthesia Plan    ASA 2     epidural       Anesthetic plan, all risks, benefits, and alternatives have been provided, discussed and informed consent has been obtained with: patient.

## 2021-08-14 NOTE — PLAN OF CARE
Goal Outcome Evaluation: Induction of labor initiated. Pt resting with significant other at bedside. Pt denies needs at this time.

## 2021-08-14 NOTE — ANESTHESIA POSTPROCEDURE EVALUATION
Patient: Evi Hare    Procedure Summary     Date: 21 Room / Location:  [facility] LABOR DELIVERY    Anesthesia Start:  Anesthesia Stop:     Procedure:  SECTION PRIMARY (Bilateral Abdomen) Diagnosis:     Surgeons: Richard Dumont III, MD Provider: Thai Rosales MD    Anesthesia Type: epidural ASA Status: 2          Anesthesia Type: epidural    Vitals  Vitals Value Taken Time   /85 21 1411   Temp 98.96 °F (37.2 °C) 21 1421   Pulse 108 21 1527   Resp     SpO2 98 % 21 1527   Vitals shown include unvalidated device data.        Post Anesthesia Care and Evaluation    Patient location during evaluation: PACU  Patient participation: complete - patient participated  Level of consciousness: sleepy but conscious  Pain score: 0  Pain management: satisfactory to patient  Airway patency: patent  Anesthetic complications: No anesthetic complications  PONV Status: none  Cardiovascular status: hemodynamically stable  Respiratory status: nasal cannula  Hydration status: acceptable

## 2021-08-15 PROBLEM — Z33.1 IUP (INTRAUTERINE PREGNANCY), INCIDENTAL: Status: ACTIVE | Noted: 2021-08-15

## 2021-08-15 LAB
BASOPHILS # BLD AUTO: 0.04 10*3/MM3 (ref 0–0.2)
BASOPHILS NFR BLD AUTO: 0.3 % (ref 0–1.5)
DEPRECATED RDW RBC AUTO: 48.7 FL (ref 37–54)
EOSINOPHIL # BLD AUTO: 0.06 10*3/MM3 (ref 0–0.4)
EOSINOPHIL NFR BLD AUTO: 0.4 % (ref 0.3–6.2)
ERYTHROCYTE [DISTWIDTH] IN BLOOD BY AUTOMATED COUNT: 13.8 % (ref 12.3–15.4)
HCT VFR BLD AUTO: 33.6 % (ref 34–46.6)
HGB BLD-MCNC: 11.1 G/DL (ref 12–15.9)
IMM GRANULOCYTES # BLD AUTO: 0.08 10*3/MM3 (ref 0–0.05)
IMM GRANULOCYTES NFR BLD AUTO: 0.6 % (ref 0–0.5)
LYMPHOCYTES # BLD AUTO: 1.24 10*3/MM3 (ref 0.7–3.1)
LYMPHOCYTES NFR BLD AUTO: 8.8 % (ref 19.6–45.3)
MCH RBC QN AUTO: 32 PG (ref 26.6–33)
MCHC RBC AUTO-ENTMCNC: 33 G/DL (ref 31.5–35.7)
MCV RBC AUTO: 96.8 FL (ref 79–97)
MONOCYTES # BLD AUTO: 1.29 10*3/MM3 (ref 0.1–0.9)
MONOCYTES NFR BLD AUTO: 9.2 % (ref 5–12)
NEUTROPHILS NFR BLD AUTO: 11.33 10*3/MM3 (ref 1.7–7)
NEUTROPHILS NFR BLD AUTO: 80.7 % (ref 42.7–76)
NRBC BLD AUTO-RTO: 0 /100 WBC (ref 0–0.2)
PLATELET # BLD AUTO: 178 10*3/MM3 (ref 140–450)
PMV BLD AUTO: 10.2 FL (ref 6–12)
RBC # BLD AUTO: 3.47 10*6/MM3 (ref 3.77–5.28)
WBC # BLD AUTO: 14.04 10*3/MM3 (ref 3.4–10.8)

## 2021-08-15 PROCEDURE — 85025 COMPLETE CBC W/AUTO DIFF WBC: CPT | Performed by: OBSTETRICS & GYNECOLOGY

## 2021-08-15 RX ORDER — ALUMINA, MAGNESIA, AND SIMETHICONE 2400; 2400; 240 MG/30ML; MG/30ML; MG/30ML
15 SUSPENSION ORAL EVERY 6 HOURS PRN
Status: DISCONTINUED | OUTPATIENT
Start: 2021-08-15 | End: 2021-08-16 | Stop reason: HOSPADM

## 2021-08-15 RX ADMIN — ALUMINUM HYDROXIDE, MAGNESIUM HYDROXIDE, AND DIMETHICONE 15 ML: 400; 400; 40 SUSPENSION ORAL at 17:47

## 2021-08-15 RX ADMIN — OXYCODONE HYDROCHLORIDE AND ACETAMINOPHEN 1 TABLET: 10; 325 TABLET ORAL at 15:03

## 2021-08-15 RX ADMIN — OXYCODONE HYDROCHLORIDE AND ACETAMINOPHEN 1 TABLET: 10; 325 TABLET ORAL at 21:34

## 2021-08-15 RX ADMIN — LABETALOL HYDROCHLORIDE 200 MG: 200 TABLET, FILM COATED ORAL at 05:54

## 2021-08-15 RX ADMIN — IBUPROFEN 800 MG: 800 TABLET, FILM COATED ORAL at 05:54

## 2021-08-15 RX ADMIN — IBUPROFEN 800 MG: 800 TABLET, FILM COATED ORAL at 15:00

## 2021-08-15 RX ADMIN — OXYCODONE HYDROCHLORIDE AND ACETAMINOPHEN 1 TABLET: 10; 325 TABLET ORAL at 08:19

## 2021-08-15 RX ADMIN — CETIRIZINE HYDROCHLORIDE 10 MG: 10 TABLET, FILM COATED ORAL at 08:19

## 2021-08-15 RX ADMIN — LABETALOL HYDROCHLORIDE 200 MG: 200 TABLET, FILM COATED ORAL at 21:34

## 2021-08-15 RX ADMIN — IBUPROFEN 800 MG: 800 TABLET, FILM COATED ORAL at 21:34

## 2021-08-15 NOTE — PLAN OF CARE
Goal Outcome Evaluation:           Progress: improving  Outcome Summary: Pt.'s vitals and bleeding wnl, pain controlled with prn meds, pressure dressing in place, no needs at this time

## 2021-08-15 NOTE — PLAN OF CARE
Goal Outcome Evaluation:  Plan of Care Reviewed With: patient        Progress: improving  Outcome Summary: pt progressing toward goals.

## 2021-08-15 NOTE — PROGRESS NOTES
Primary Low Transverse  Section Progress Note      Hospital Course: G1, now P 0101. Patient was admitted on 2021 12:00 PM with IUP at 36w3d weeks gestation secondary to Hypertension affecting pregnancy [O16.9]  IUP (intrauterine pregnancy), incidental [Z33.1]. Patient underwent a primary low transverse  section.       Patient stable. No complaints.     signs in last 24 hours:    Vital Signs Range for the last 24 hours              Temp:  [98 °F (36.7 °C)-99 °F (37.2 °C)] 98.4 °F (36.9 °C)  Heart Rate:  [] 110  Resp:  [16-20] 20  BP: (114-171)/() 121/66     Radiology     Imaging Results (Last 24 Hours)     ** No results found for the last 24 hours. **           Labs     Lab Results (last 24 hours)     Procedure Component Value Units Date/Time    CBC & Differential [[id number]]  (Abnormal) Collected: 08/15/21 0655    Specimen: Blood Updated: 08/15/21 0732    Narrative:      The following orders were created for panel order CBC & Differential.  Procedure                               Abnormality         Status                     ---------                               -----------         ------                     CBC Auto Differential[[id number]]        Abnormal            Final result                 Please view results for these tests on the individual orders.    CBC Auto Differential [[id number]]  (Abnormal) Collected: 08/15/21 0655    Specimen: Blood Updated: 08/15/21 0732     WBC 14.04 10*3/mm3      RBC 3.47 10*6/mm3      Hemoglobin 11.1 g/dL      Hematocrit 33.6 %      MCV 96.8 fL      MCH 32.0 pg      MCHC 33.0 g/dL      RDW 13.8 %      RDW-SD 48.7 fl      MPV 10.2 fL      Platelets 178 10*3/mm3      Neutrophil % 80.7 %      Lymphocyte % 8.8 %      Monocyte % 9.2 %      Eosinophil % 0.4 %      Basophil % 0.3 %      Immature Grans % 0.6 %      Neutrophils, Absolute 11.33 10*3/mm3      Lymphocytes, Absolute 1.24 10*3/mm3      Monocytes, Absolute 1.29 10*3/mm3      Eosinophils,  Absolute 0.06 10*3/mm3      Basophils, Absolute 0.04 10*3/mm3      Immature Grans, Absolute 0.08 10*3/mm3      nRBC 0.0 /100 WBC             Review of Systems    The following systems were reviewed and negative;  ENT, respiratory, cardiovascular, gastrointestinal, genitourinary, breast, endocrine and allergies / immunologic.      Physical Exam:      General Appearance:    Alert, cooperative, in no acute distress   Head:    Normocephalic, without obvious abnormality, atraumatic   Eyes:            Lids and lashes normal, conjunctivae and sclerae normal, no   icterus, no pallor, corneas clear, PERRLA   Ears:    Ears appear intact with no abnormalities noted   Throat:   No oral lesions, no thrush, oral mucosa moist   Neck:   No adenopathy, supple, trachea midline, no thyromegaly, no     carotid bruit, no JVD   Back:     No kyphosis present, no scoliosis present, no skin lesions,       erythema or scars, no tenderness to percussion or                   palpation,   range of motion normal   Lungs:     Clear to auscultation,respirations regular, even and                   unlabored    Heart:    Regular rhythm and normal rate, normal S1 and S2, no            murmur, no gallop, no rub, no click   Breast Exam:    Deferred   Abdomen:     Normal bowel sounds, no masses, no organomegaly, soft        non-tender, non-distended, no guarding, no rebound                 tenderness   Genitalia:    Deferred   Extremities:   Moves all extremities well, no edema, no cyanosis, no              redness   Pulses:   Pulses palpable and equal bilaterally   Skin:   No bleeding, bruising or rash   Lymph nodes:   No palpable adenopathy   Neurologic:   Cranial nerves 2 - 12 grossly intact, sensation intact, DTR        present and equal bilaterally                 Assessment:  1.  Primary Low Transverse  Section. POD#1. Patient doing well. No complaints.     Plan:  1. Will continue current plan of care and anticipate discharge to home in  the AN.      Richard Dumont III, MD  08/15/21  09:49 EDT

## 2021-08-16 VITALS
BODY MASS INDEX: 39.7 KG/M2 | DIASTOLIC BLOOD PRESSURE: 81 MMHG | OXYGEN SATURATION: 97 % | HEIGHT: 66 IN | TEMPERATURE: 98.6 F | HEART RATE: 108 BPM | WEIGHT: 247 LBS | SYSTOLIC BLOOD PRESSURE: 144 MMHG | RESPIRATION RATE: 18 BRPM

## 2021-08-16 RX ORDER — IBUPROFEN 800 MG/1
800 TABLET ORAL EVERY 8 HOURS PRN
Qty: 60 TABLET | Refills: 1 | Status: SHIPPED | OUTPATIENT
Start: 2021-08-16

## 2021-08-16 RX ORDER — LABETALOL 200 MG/1
200 TABLET, FILM COATED ORAL EVERY 8 HOURS SCHEDULED
Qty: 45 TABLET | Refills: 0 | Status: SHIPPED | OUTPATIENT
Start: 2021-08-16

## 2021-08-16 RX ORDER — OXYCODONE HYDROCHLORIDE AND ACETAMINOPHEN 5; 325 MG/1; MG/1
1 TABLET ORAL EVERY 4 HOURS PRN
Qty: 15 TABLET | Refills: 0 | Status: SHIPPED | OUTPATIENT
Start: 2021-08-16 | End: 2021-08-19

## 2021-08-16 RX ORDER — SIMETHICONE 80 MG
80 TABLET,CHEWABLE ORAL EVERY 6 HOURS PRN
Qty: 40 TABLET | Refills: 1 | Status: SHIPPED | OUTPATIENT
Start: 2021-08-16

## 2021-08-16 RX ADMIN — CETIRIZINE HYDROCHLORIDE 10 MG: 10 TABLET, FILM COATED ORAL at 08:37

## 2021-08-16 RX ADMIN — IBUPROFEN 800 MG: 800 TABLET, FILM COATED ORAL at 05:06

## 2021-08-16 RX ADMIN — LABETALOL HYDROCHLORIDE 200 MG: 200 TABLET, FILM COATED ORAL at 13:54

## 2021-08-16 RX ADMIN — OXYCODONE HYDROCHLORIDE AND ACETAMINOPHEN 1 TABLET: 10; 325 TABLET ORAL at 10:51

## 2021-08-16 RX ADMIN — PRENATAL VIT W/ FE FUMARATE-FA TAB 27-0.8 MG 1 TABLET: 27-0.8 TAB at 08:37

## 2021-08-16 RX ADMIN — IBUPROFEN 800 MG: 800 TABLET, FILM COATED ORAL at 13:54

## 2021-08-16 NOTE — DISCHARGE SUMMARY
ARELIS Yuan  Delivery Discharge Summary    Primary OB Clinician:     EDC: Estimated Date of Delivery: 21    Gestational Age:36w3d    Antepartum complications: pregnancy-induced hypertension    Date of Delivery: 2021   Time of Delivery: 2:53 PM     Delivered By:  Richard Dumont Iii     Delivery Type: , Low Transverse      Tubal Ligation: n/a    Baby: Female  Apgar:  8  @ 1 minute /   Apgar:  9  @ 5 minutes   Weight: 3305 g (7 lb 4.6 oz)    Anesthesia: Epidural;Spinal      Intrapartum complications: PIH    Subjective     Subjective   Post-Op Day 2 S/P   Subjective  Patient reports:  Pain is controlled with ibuprofen (OTC) and narcotic analgesics including Percocet.  She is up out of bed this am. Tolerating diet. Tolerating po -- normal. Voiding - without difficulty; flatus reported..  Vaginal bleeding is as much as expected.    Breastfeeding: with difficulty.    Objective    Objective  Vitals: Vital Signs Range for the last 24 hours  Temperature: Temp:  [98.1 °F (36.7 °C)-98.8 °F (37.1 °C)] 98.6 °F (37 °C)   Temp Source: Temp src: Temporal   BP: BP: (117-141)/(73-89) 128/80   Pulse: Heart Rate:  [] 94   Respirations: Resp:  [18] 18   Weight:              Physical Exam    Lungs clear to auscultation bilaterally   Abdomen Soft, ND, tender along incision. + BS   Incision  well approximated   Extremities extremities normal, atraumatic, no cyanosis + edema equal bilaterally no erythema or warmth.       [unfilled]       Lab Results   Component Value Date    ABO O 2021    RH Positive 2021        Lab Results   Component Value Date    HGB 11.1 (L) 08/15/2021    HCT 33.6 (L) 08/15/2021       Assesment and Plan:       Hypertension affecting pregnancy    Eclampsia complicating pregnancy, third trimester    IUP (intrauterine pregnancy), incidental    Postpartum care following  delivery    Assessment & Plan    Assessment:    Evi Hare is Day 2  post-partum  , Low  Transverse   .      Plan:  continue post op care and plan for discharge today.   BP stable on Labetalol TID.  Check BP at home.  May wean down to q12h with BP less than 140/80.  Follow up in one week for blood pressure check.    D/C antihistamines and buspar due to breastfeeding.  Will start zoloft.  Patient has dye allergy.  Notify office with any itching, hives, allergic reactions.      Discharge Date: 8/16/2021; Discharge Time: 09:14 EDT        VESTA Arroyo  8/16/2021  09:13 EDT

## 2021-08-16 NOTE — PLAN OF CARE
Problem: Adult Inpatient Plan of Care  Goal: Plan of Care Review  Outcome: Adequate for Care Transition  Flowsheets  Taken 2021 0333 by Lindy Flores RN  Progress: improving  Taken 8/15/2021 180 by Ankita Rutledge, RN  Plan of Care Reviewed With: patient  Outcome Summary: pt progressing toward goals.  Goal: Patient-Specific Goal (Individualized)  Outcome: Adequate for Care Transition  Goal: Absence of Hospital-Acquired Illness or Injury  Outcome: Adequate for Care Transition  Intervention: Identify and Manage Fall Risk  Recent Flowsheet Documentation  Taken 2021 08 by Kika Wade RN  Safety Promotion/Fall Prevention: safety round/check completed  Intervention: Prevent and Manage VTE (venous thromboembolism) Risk  Recent Flowsheet Documentation  Taken 2021 08 by Kika Wade RN  VTE Prevention/Management: (ambulating) other (see comments)  Intervention: Prevent Infection  Recent Flowsheet Documentation  Taken 2021 08 by Kika Wade RN  Infection Prevention:   visitors restricted/screened   single patient room provided   rest/sleep promoted   personal protective equipment utilized   hand hygiene promoted   equipment surfaces disinfected   environmental surveillance performed  Goal: Optimal Comfort and Wellbeing  Outcome: Adequate for Care Transition  Intervention: Provide Person-Centered Care  Recent Flowsheet Documentation  Taken 2021 by Kika Wade RN  Trust Relationship/Rapport:   care explained   emotional support provided   empathic listening provided   questions answered   questions encouraged   reassurance provided   thoughts/feelings acknowledged  Goal: Readiness for Transition of Care  Outcome: Adequate for Care Transition     Problem:  Fall Injury Risk  Goal: Absence of Fall, Infant Drop and Related Injury  Outcome: Adequate for Care Transition  Intervention: Identify and Manage Contributors to Fall Injury Risk  Recent  Flowsheet Documentation  Taken 2021 0837 by Kika Wade, RN  Medication Review/Management: medications reviewed  Intervention: Promote Injury-Free Environment  Recent Flowsheet Documentation  Taken 2021 0837 by Kika Wade, RN  Safety Promotion/Fall Prevention: safety round/check completed     Problem: Hypertensive Disorders in Pregnancy  Goal: Maternal-Fetal Stabilization  Outcome: Adequate for Care Transition  Intervention: Monitor and Manage Sign/Symptom Progression  Recent Flowsheet Documentation  Taken 2021 0837 by Kika Wade, RN  Medication Review/Management: medications reviewed     Problem: Maternal-Fetal Wellbeing  Goal: Optimal Maternal-Fetal Wellbeing  Outcome: Adequate for Care Transition  Intervention: Support Psychosocial Response to Complications During Pregnancy  Recent Flowsheet Documentation  Taken 2021 0837 by Kika Wade, RN  Family/Support System Care: self-care encouraged     Problem: Adjustment to Role Transition (Postpartum  Delivery)  Goal: Successful Maternal Role Transition  Outcome: Adequate for Care Transition     Problem: Bleeding (Postpartum  Delivery)  Goal: Hemostasis  Outcome: Adequate for Care Transition     Problem: Infection (Postpartum  Delivery)  Goal: Absence of Infection Signs and Symptoms  Outcome: Adequate for Care Transition     Problem: Pain (Postpartum  Delivery)  Goal: Acceptable Pain Control  Outcome: Adequate for Care Transition  Intervention: Prevent or Manage Pain  Recent Flowsheet Documentation  Taken 2021 1051 by Kika Wade, RN  Pain Management Interventions: (percocet) see MAR     Problem: Postoperative Nausea and Vomiting (Postpartum  Delivery)  Goal: Nausea and Vomiting Relief  Outcome: Adequate for Care Transition     Problem: Postoperative Urinary Retention (Postpartum  Delivery)  Goal: Effective Urinary Elimination  Outcome: Adequate for Care  Transition  Intervention: Monitor and Manage Urinary Retention  Recent Flowsheet Documentation  Taken 8/16/2021 0837 by Kika Wade, RN  Urinary Elimination Promotion: frequent voiding encouraged   Goal Outcome Evaluation:

## 2021-08-16 NOTE — DISCHARGE INSTR - APPOINTMENTS
Please return to [facility] on Wednesday Sept. 8th at 8:40 am for a follow up appointment with Dr. Bobo.

## 2021-09-08 ENCOUNTER — HOSPITAL ENCOUNTER (OUTPATIENT)
Dept: LACTATION | Facility: HOSPITAL | Age: 31
Discharge: HOME | End: 2021-09-08

## 2021-09-08 NOTE — LACTATION NOTE
Pt can in with complaints of baby not staying latched. I had mother to put baby to the breast so I could see the hold and latch. Mother placed baby to the breast with body away from her body, and head at breast. I showed her how to hold baby in cross cradle hold with her hand supporting the baby's head and her arm supporting  the baby. Baby latched and nursed will until mother put her hand on the back of baby's head. I helped her change her hold again and explained the hold to her. We talked about how to hold the baby each time she was feeding it. I talked to her about several holds and how they worked.

## 2024-05-07 LAB
EXTERNAL CHLAMYDIA SCREEN: NEGATIVE
EXTERNAL GONORRHEA SCREEN: NEGATIVE
EXTERNAL HEPATITIS B SURFACE ANTIGEN: NEGATIVE
EXTERNAL RUBELLA QUALITATIVE: NORMAL
HIV1 P24 AG SERPL QL IA: NORMAL

## 2024-11-05 ENCOUNTER — HOSPITAL ENCOUNTER (OUTPATIENT)
Facility: HOSPITAL | Age: 34
Discharge: HOME | End: 2024-11-05
Attending: OBSTETRICS & GYNECOLOGY | Admitting: OBSTETRICS & GYNECOLOGY
Payer: COMMERCIAL

## 2024-11-05 VITALS
RESPIRATION RATE: 18 BRPM | HEART RATE: 92 BPM | TEMPERATURE: 97.4 F | BODY MASS INDEX: 38.99 KG/M2 | SYSTOLIC BLOOD PRESSURE: 131 MMHG | WEIGHT: 234 LBS | HEIGHT: 65 IN | DIASTOLIC BLOOD PRESSURE: 82 MMHG

## 2024-11-05 PROBLEM — M54.9 BACK PAIN: Status: ACTIVE | Noted: 2024-11-05

## 2024-11-05 LAB
BACTERIA UR QL AUTO: ABNORMAL /HPF
BILIRUB UR QL STRIP: NEGATIVE
CLARITY UR: ABNORMAL
COLOR UR: ABNORMAL
GLUCOSE UR STRIP-MCNC: NEGATIVE MG/DL
HGB UR QL STRIP.AUTO: ABNORMAL
HYALINE CASTS UR QL AUTO: ABNORMAL /LPF
KETONES UR QL STRIP: ABNORMAL
LEUKOCYTE ESTERASE UR QL STRIP.AUTO: ABNORMAL
NITRITE UR QL STRIP: NEGATIVE
PH UR STRIP.AUTO: 6.5 [PH] (ref 5–8)
PROT UR QL STRIP: ABNORMAL
RBC # UR STRIP: ABNORMAL /HPF
REF LAB TEST METHOD: ABNORMAL
SP GR UR STRIP: 1.01 (ref 1–1.03)
SQUAMOUS #/AREA URNS HPF: ABNORMAL /HPF
UROBILINOGEN UR QL STRIP: ABNORMAL
WBC # UR STRIP: ABNORMAL /HPF

## 2024-11-05 PROCEDURE — 81001 URINALYSIS AUTO W/SCOPE: CPT | Performed by: OBSTETRICS & GYNECOLOGY

## 2024-11-05 PROCEDURE — P9612 CATHETERIZE FOR URINE SPEC: HCPCS

## 2024-11-05 PROCEDURE — 59025 FETAL NON-STRESS TEST: CPT

## 2024-11-05 PROCEDURE — G0463 HOSPITAL OUTPT CLINIC VISIT: HCPCS

## 2024-11-05 PROCEDURE — 87086 URINE CULTURE/COLONY COUNT: CPT | Performed by: OBSTETRICS & GYNECOLOGY

## 2024-11-05 RX ORDER — ASPIRIN 81 MG/1
TABLET, CHEWABLE ORAL DAILY
COMMUNITY

## 2024-11-05 RX ORDER — CEPHALEXIN 500 MG/1
500 CAPSULE ORAL 2 TIMES DAILY
Status: ON HOLD | COMMUNITY
End: 2024-11-05

## 2024-11-05 RX ORDER — FLUOXETINE 10 MG/1
10 CAPSULE ORAL DAILY
COMMUNITY

## 2024-11-05 RX ORDER — NIFEDIPINE 30 MG/1
30 TABLET, EXTENDED RELEASE ORAL DAILY
COMMUNITY

## 2024-11-05 RX ORDER — CEPHALEXIN 500 MG/1
500 CAPSULE ORAL 3 TIMES DAILY
Qty: 7 CAPSULE | Refills: 0 | Status: SHIPPED | OUTPATIENT
Start: 2024-11-05

## 2024-11-05 RX ORDER — BUSPIRONE HYDROCHLORIDE 15 MG/1
15 TABLET ORAL 3 TIMES DAILY
COMMUNITY

## 2024-11-05 RX ORDER — DOCUSATE SODIUM 100 MG/1
100 CAPSULE, LIQUID FILLED ORAL 2 TIMES DAILY
COMMUNITY

## 2024-11-05 NOTE — NON STRESS TEST
Evi Hare, a  at 34w5d with an JAMES of 2024, by Patient Reported, was seen at [facility] for a nonstress test.    Chief Complaint   Patient presents with    Back Pain     Rt sided back pain  Pt states she was seen by primary care yesterday for symptoms of UTI. Pt states was dx with UTI and prescribed Keflex, which she has taken 3 doses of. U/A reviewed with lg amt of bld noted, trace of leuks. Pt states does have h/o kidney stones with last one being 5 years ago.         Patient Active Problem List   Diagnosis    Pregnant    Pregnancy    Hypertension affecting pregnancy    Elevated blood pressure reading    Eclampsia complicating pregnancy, third trimester    IUP (intrauterine pregnancy), incidental    Postpartum care following  delivery    Back pain       Start Time: 1528  Stop Time: 1548    Interpretation A  Nonstress Test Interpretation A: Reactive  Comments A: verified by MB

## 2024-11-07 ENCOUNTER — HOSPITAL ENCOUNTER (OUTPATIENT)
Facility: HOSPITAL | Age: 34
Discharge: HOME | End: 2024-11-07
Attending: OBSTETRICS & GYNECOLOGY | Admitting: OBSTETRICS & GYNECOLOGY
Payer: COMMERCIAL

## 2024-11-07 VITALS — HEIGHT: 65 IN | WEIGHT: 234.8 LBS | TEMPERATURE: 98.1 F | BODY MASS INDEX: 39.12 KG/M2 | RESPIRATION RATE: 18 BRPM

## 2024-11-07 PROBLEM — Z36.89 NST (NON-STRESS TEST) REACTIVE: Status: ACTIVE | Noted: 2024-11-07

## 2024-11-07 LAB — BACTERIA SPEC AEROBE CULT: NO GROWTH

## 2024-11-07 PROCEDURE — G0463 HOSPITAL OUTPT CLINIC VISIT: HCPCS

## 2024-11-07 PROCEDURE — 59025 FETAL NON-STRESS TEST: CPT

## 2024-11-07 NOTE — NURSING NOTE
Evi Hare, a  at 35w0d with an JAMES of 2024, by Patient Reported, was seen at [facility] for a nonstress test.    Chief Complaint   Patient presents with    Non-stress Test       Patient Active Problem List   Diagnosis    Pregnant    Pregnancy    Hypertension affecting pregnancy    Elevated blood pressure reading    Eclampsia complicating pregnancy, third trimester    IUP (intrauterine pregnancy), incidental    Postpartum care following  delivery    Back pain    NST (non-stress test) reactive       Start Time: 1125  Stop Time: 1155    Interpretation A  Nonstress Test Interpretation A: Reactive  Comments A: STEFAN Monteiro

## 2024-11-07 NOTE — NON STRESS TEST
Evi Hare, a  at 35w0d with an JAMES of 2024, by Patient Reported, was seen at [facility] DEL[facility] for a nonstress test.    Chief Complaint   Patient presents with    Non-stress Test       Patient Active Problem List   Diagnosis    Pregnant    Pregnancy    Hypertension affecting pregnancy    Elevated blood pressure reading    Eclampsia complicating pregnancy, third trimester    IUP (intrauterine pregnancy), incidental    Postpartum care following  delivery    Back pain    NST (non-stress test) reactive       Start Time: 1110  Stop Time: 1140    Interpretation A  Nonstress Test Interpretation A: Reactive  Comments A: STEFAN Monteiro

## 2024-11-14 ENCOUNTER — HOSPITAL ENCOUNTER (INPATIENT)
Facility: HOSPITAL | Age: 34
LOS: 4 days | Discharge: HOME | End: 2024-11-19
Attending: OBSTETRICS & GYNECOLOGY | Admitting: OBSTETRICS & GYNECOLOGY
Payer: COMMERCIAL

## 2024-11-14 ENCOUNTER — APPOINTMENT (OUTPATIENT)
Dept: ULTRASOUND IMAGING | Facility: HOSPITAL | Age: 34
End: 2024-11-14
Payer: COMMERCIAL

## 2024-11-14 DIAGNOSIS — O11.9 CHRONIC HYPERTENSION WITH SUPERIMPOSED PREECLAMPSIA: Primary | ICD-10-CM

## 2024-11-14 PROBLEM — O36.8190 DECREASED FETAL MOVEMENT: Status: ACTIVE | Noted: 2024-11-14

## 2024-11-14 PROCEDURE — 59025 FETAL NON-STRESS TEST: CPT

## 2024-11-14 PROCEDURE — 76819 FETAL BIOPHYS PROFIL W/O NST: CPT | Performed by: RADIOLOGY

## 2024-11-14 PROCEDURE — G0463 HOSPITAL OUTPT CLINIC VISIT: HCPCS

## 2024-11-14 PROCEDURE — G0378 HOSPITAL OBSERVATION PER HR: HCPCS

## 2024-11-14 PROCEDURE — 76819 FETAL BIOPHYS PROFIL W/O NST: CPT

## 2024-11-14 RX ORDER — FLUOXETINE 10 MG/1
10 CAPSULE ORAL DAILY
Status: DISCONTINUED | OUTPATIENT
Start: 2024-11-15 | End: 2024-11-15

## 2024-11-14 RX ORDER — PRENATAL VIT/IRON FUM/FOLIC AC 27MG-0.8MG
1 TABLET ORAL DAILY
Status: DISCONTINUED | OUTPATIENT
Start: 2024-11-15 | End: 2024-11-15

## 2024-11-14 RX ORDER — DOCUSATE SODIUM 100 MG/1
100 CAPSULE, LIQUID FILLED ORAL 2 TIMES DAILY
Status: DISCONTINUED | OUTPATIENT
Start: 2024-11-14 | End: 2024-11-15

## 2024-11-14 RX ORDER — NIFEDIPINE 30 MG/1
60 TABLET, EXTENDED RELEASE ORAL DAILY
Status: DISCONTINUED | OUTPATIENT
Start: 2024-11-15 | End: 2024-11-15

## 2024-11-14 RX ORDER — ASPIRIN 81 MG/1
81 TABLET, CHEWABLE ORAL DAILY
Status: DISCONTINUED | OUTPATIENT
Start: 2024-11-15 | End: 2024-11-15

## 2024-11-14 RX ORDER — NIFEDIPINE 60 MG/1
60 TABLET, EXTENDED RELEASE ORAL DAILY
COMMUNITY
End: 2024-11-19

## 2024-11-14 RX ORDER — FAMOTIDINE 20 MG/1
20 TABLET, FILM COATED ORAL
Status: DISCONTINUED | OUTPATIENT
Start: 2024-11-14 | End: 2024-11-15

## 2024-11-14 RX ADMIN — FAMOTIDINE 20 MG: 20 TABLET ORAL at 23:23

## 2024-11-14 RX ADMIN — BUSPIRONE HYDROCHLORIDE 15 MG: 5 TABLET ORAL at 23:22

## 2024-11-14 RX ADMIN — DOCUSATE SODIUM 100 MG: 100 CAPSULE, LIQUID FILLED ORAL at 23:23

## 2024-11-14 NOTE — NON STRESS TEST
Evi Hare, a  at 36w0d with an JAMES of 2024, by Last Menstrual Period, was seen at [facility] DEL[facility]ERY for a nonstress test.    Chief Complaint   Patient presents with    Non-stress Test     PT WAS SEEN IN OB OFFICE TODAY. HAD NST. PT HAS HX OF PRE E WITH LAST PREGANCY BPP WAS DONE WITH SCORE OF 4/8.        Patient Active Problem List   Diagnosis    Pregnant    Pregnancy    Hypertension affecting pregnancy    Elevated blood pressure reading    Eclampsia complicating pregnancy, third trimester    IUP (intrauterine pregnancy), incidental    Postpartum care following  delivery    Back pain    NST (non-stress test) reactive    Decreased fetal movement       Start Time: 1700    Stop Time: 1720    Interpretation A  Nonstress Test Interpretation A: Reactive  Comments A: VERIFIED BY SARAH CONTRERAS RN

## 2024-11-14 NOTE — PLAN OF CARE
Goal Outcome Evaluation:  Plan of Care Reviewed With: patient        Progress: improving  Outcome Evaluation: REACTIVE NST. + FETAL MOVEMENT. AWAITING BPP RESULTS.

## 2024-11-15 ENCOUNTER — APPOINTMENT (OUTPATIENT)
Dept: ULTRASOUND IMAGING | Facility: HOSPITAL | Age: 34
End: 2024-11-15
Payer: COMMERCIAL

## 2024-11-15 ENCOUNTER — ANESTHESIA (OUTPATIENT)
Dept: LABOR AND DELIVERY | Facility: HOSPITAL | Age: 34
End: 2024-11-15
Payer: COMMERCIAL

## 2024-11-15 ENCOUNTER — ANESTHESIA EVENT (OUTPATIENT)
Dept: LABOR AND DELIVERY | Facility: HOSPITAL | Age: 34
End: 2024-11-15
Payer: COMMERCIAL

## 2024-11-15 PROBLEM — O11.9 CHRONIC HYPERTENSION WITH SUPERIMPOSED PREECLAMPSIA: Status: ACTIVE | Noted: 2024-11-15

## 2024-11-15 LAB
ABO GROUP BLD: NORMAL
ALBUMIN SERPL-MCNC: 3.1 G/DL (ref 3.5–5.2)
ALBUMIN/GLOB SERPL: 1 G/DL
ALP SERPL-CCNC: 157 U/L (ref 39–117)
ALT SERPL W P-5'-P-CCNC: 31 U/L (ref 1–33)
ANION GAP SERPL CALCULATED.3IONS-SCNC: 11.3 MMOL/L (ref 5–15)
AST SERPL-CCNC: 18 U/L (ref 1–32)
BACTERIA UR QL AUTO: ABNORMAL /HPF
BASOPHILS # BLD AUTO: 0.04 10*3/MM3 (ref 0–0.2)
BASOPHILS NFR BLD AUTO: 0.4 % (ref 0–1.5)
BILIRUB SERPL-MCNC: 0.3 MG/DL (ref 0–1.2)
BILIRUB UR QL STRIP: NEGATIVE
BLD GP AB SCN SERPL QL: NEGATIVE
BUN SERPL-MCNC: 7 MG/DL (ref 6–20)
BUN/CREAT SERPL: 15.2 (ref 7–25)
CALCIUM SPEC-SCNC: 8.9 MG/DL (ref 8.6–10.5)
CHLORIDE SERPL-SCNC: 105 MMOL/L (ref 98–107)
CLARITY UR: ABNORMAL
CO2 SERPL-SCNC: 22.7 MMOL/L (ref 22–29)
COLOR UR: ABNORMAL
CREAT SERPL-MCNC: 0.46 MG/DL (ref 0.57–1)
DEPRECATED RDW RBC AUTO: 48.4 FL (ref 37–54)
EGFRCR SERPLBLD CKD-EPI 2021: 130.6 ML/MIN/1.73
EOSINOPHIL # BLD AUTO: 0.25 10*3/MM3 (ref 0–0.4)
EOSINOPHIL NFR BLD AUTO: 2.7 % (ref 0.3–6.2)
ERYTHROCYTE [DISTWIDTH] IN BLOOD BY AUTOMATED COUNT: 13.4 % (ref 12.3–15.4)
GLOBULIN UR ELPH-MCNC: 3 GM/DL
GLUCOSE SERPL-MCNC: 109 MG/DL (ref 65–99)
GLUCOSE UR STRIP-MCNC: NEGATIVE MG/DL
HCT VFR BLD AUTO: 39.3 % (ref 34–46.6)
HGB BLD-MCNC: 13.2 G/DL (ref 12–15.9)
HGB UR QL STRIP.AUTO: ABNORMAL
HYALINE CASTS UR QL AUTO: ABNORMAL /LPF
IMM GRANULOCYTES # BLD AUTO: 0.16 10*3/MM3 (ref 0–0.05)
IMM GRANULOCYTES NFR BLD AUTO: 1.7 % (ref 0–0.5)
KETONES UR QL STRIP: ABNORMAL
LDH SERPL-CCNC: 180 U/L (ref 135–214)
LEUKOCYTE ESTERASE UR QL STRIP.AUTO: ABNORMAL
LYMPHOCYTES # BLD AUTO: 1.61 10*3/MM3 (ref 0.7–3.1)
LYMPHOCYTES NFR BLD AUTO: 17.3 % (ref 19.6–45.3)
MCH RBC QN AUTO: 33 PG (ref 26.6–33)
MCHC RBC AUTO-ENTMCNC: 33.6 G/DL (ref 31.5–35.7)
MCV RBC AUTO: 98.3 FL (ref 79–97)
MONOCYTES # BLD AUTO: 0.76 10*3/MM3 (ref 0.1–0.9)
MONOCYTES NFR BLD AUTO: 8.2 % (ref 5–12)
NEUTROPHILS NFR BLD AUTO: 6.49 10*3/MM3 (ref 1.7–7)
NEUTROPHILS NFR BLD AUTO: 69.7 % (ref 42.7–76)
NITRITE UR QL STRIP: NEGATIVE
NRBC BLD AUTO-RTO: 0 /100 WBC (ref 0–0.2)
PH UR STRIP.AUTO: 7 [PH] (ref 5–8)
PLATELET # BLD AUTO: 226 10*3/MM3 (ref 140–450)
PMV BLD AUTO: 10 FL (ref 6–12)
POTASSIUM SERPL-SCNC: 3.5 MMOL/L (ref 3.5–5.2)
PROT SERPL-MCNC: 6.1 G/DL (ref 6–8.5)
PROT UR QL STRIP: ABNORMAL
RBC # BLD AUTO: 4 10*6/MM3 (ref 3.77–5.28)
RBC # UR STRIP: ABNORMAL /HPF
REF LAB TEST METHOD: ABNORMAL
RH BLD: POSITIVE
SODIUM SERPL-SCNC: 139 MMOL/L (ref 136–145)
SP GR UR STRIP: 1.01 (ref 1–1.03)
SQUAMOUS #/AREA URNS HPF: ABNORMAL /HPF
T&S EXPIRATION DATE: NORMAL
URATE SERPL-MCNC: 3.8 MG/DL (ref 2.4–5.7)
UROBILINOGEN UR QL STRIP: ABNORMAL
WBC # UR STRIP: ABNORMAL /HPF
WBC NRBC COR # BLD AUTO: 9.31 10*3/MM3 (ref 3.4–10.8)

## 2024-11-15 PROCEDURE — 76819 FETAL BIOPHYS PROFIL W/O NST: CPT

## 2024-11-15 PROCEDURE — 86780 TREPONEMA PALLIDUM: CPT | Performed by: OBSTETRICS & GYNECOLOGY

## 2024-11-15 PROCEDURE — 25010000002 CEFAZOLIN PER 500 MG: Performed by: OBSTETRICS & GYNECOLOGY

## 2024-11-15 PROCEDURE — 86850 RBC ANTIBODY SCREEN: CPT | Performed by: OBSTETRICS & GYNECOLOGY

## 2024-11-15 PROCEDURE — 25010000002 KETOROLAC TROMETHAMINE PER 15 MG: Performed by: OBSTETRICS & GYNECOLOGY

## 2024-11-15 PROCEDURE — 25810000003 LACTATED RINGERS PER 1000 ML: Performed by: ANESTHESIOLOGY

## 2024-11-15 PROCEDURE — 83615 LACTATE (LD) (LDH) ENZYME: CPT | Performed by: OBSTETRICS & GYNECOLOGY

## 2024-11-15 PROCEDURE — 25810000003 LACTATED RINGERS PER 1000 ML: Performed by: OBSTETRICS & GYNECOLOGY

## 2024-11-15 PROCEDURE — 86900 BLOOD TYPING SEROLOGIC ABO: CPT | Performed by: OBSTETRICS & GYNECOLOGY

## 2024-11-15 PROCEDURE — 87086 URINE CULTURE/COLONY COUNT: CPT | Performed by: OBSTETRICS & GYNECOLOGY

## 2024-11-15 PROCEDURE — 0DBU0ZZ EXCISION OF OMENTUM, OPEN APPROACH: ICD-10-PCS | Performed by: OBSTETRICS & GYNECOLOGY

## 2024-11-15 PROCEDURE — 25010000002 CEFAZOLIN PER 500 MG: Performed by: ANESTHESIOLOGY

## 2024-11-15 PROCEDURE — 76819 FETAL BIOPHYS PROFIL W/O NST: CPT | Performed by: RADIOLOGY

## 2024-11-15 PROCEDURE — 25010000002 MORPHINE PER 10 MG: Performed by: ANESTHESIOLOGY

## 2024-11-15 PROCEDURE — 84550 ASSAY OF BLOOD/URIC ACID: CPT | Performed by: OBSTETRICS & GYNECOLOGY

## 2024-11-15 PROCEDURE — 25010000002 CEFOXITIN PER 1 G: Performed by: OBSTETRICS & GYNECOLOGY

## 2024-11-15 PROCEDURE — 85025 COMPLETE CBC W/AUTO DIFF WBC: CPT | Performed by: OBSTETRICS & GYNECOLOGY

## 2024-11-15 PROCEDURE — 81001 URINALYSIS AUTO W/SCOPE: CPT | Performed by: OBSTETRICS & GYNECOLOGY

## 2024-11-15 PROCEDURE — 86901 BLOOD TYPING SEROLOGIC RH(D): CPT | Performed by: OBSTETRICS & GYNECOLOGY

## 2024-11-15 PROCEDURE — 80053 COMPREHEN METABOLIC PANEL: CPT | Performed by: OBSTETRICS & GYNECOLOGY

## 2024-11-15 PROCEDURE — 25010000002 BUPIVACAINE PF 0.75 % SOLUTION: Performed by: ANESTHESIOLOGY

## 2024-11-15 PROCEDURE — 25010000002 ONDANSETRON PER 1 MG: Performed by: ANESTHESIOLOGY

## 2024-11-15 PROCEDURE — 25810000003 LACTATED RINGERS SOLUTION: Performed by: OBSTETRICS & GYNECOLOGY

## 2024-11-15 DEVICE — SEAL HEMO SURG ARISTA/AH ABS/PWDR 3GM: Type: IMPLANTABLE DEVICE | Site: PERITONEUM | Status: FUNCTIONAL

## 2024-11-15 RX ORDER — MORPHINE SULFATE 0.5 MG/ML
INJECTION, SOLUTION EPIDURAL; INTRATHECAL; INTRAVENOUS AS NEEDED
Status: DISCONTINUED | OUTPATIENT
Start: 2024-11-15 | End: 2024-11-15

## 2024-11-15 RX ORDER — MISOPROSTOL 100 UG/1
600 TABLET ORAL AS NEEDED
Status: DISCONTINUED | OUTPATIENT
Start: 2024-11-15 | End: 2024-11-15

## 2024-11-15 RX ORDER — HYDROXYZINE HYDROCHLORIDE 25 MG/1
50 TABLET, FILM COATED ORAL EVERY 6 HOURS PRN
Status: DISCONTINUED | OUTPATIENT
Start: 2024-11-15 | End: 2024-11-19 | Stop reason: HOSPADM

## 2024-11-15 RX ORDER — NIFEDIPINE 30 MG/1
60 TABLET, EXTENDED RELEASE ORAL
Status: DISCONTINUED | OUTPATIENT
Start: 2024-11-15 | End: 2024-11-19 | Stop reason: HOSPADM

## 2024-11-15 RX ORDER — SODIUM CHLORIDE, SODIUM LACTATE, POTASSIUM CHLORIDE, CALCIUM CHLORIDE 600; 310; 30; 20 MG/100ML; MG/100ML; MG/100ML; MG/100ML
125 INJECTION, SOLUTION INTRAVENOUS CONTINUOUS
Status: DISCONTINUED | OUTPATIENT
Start: 2024-11-15 | End: 2024-11-15

## 2024-11-15 RX ORDER — METHYLERGONOVINE MALEATE 0.2 MG/ML
200 INJECTION INTRAVENOUS ONCE AS NEEDED
Status: DISCONTINUED | OUTPATIENT
Start: 2024-11-15 | End: 2024-11-15

## 2024-11-15 RX ORDER — KETOROLAC TROMETHAMINE 30 MG/ML
30 INJECTION, SOLUTION INTRAMUSCULAR; INTRAVENOUS ONCE
Status: COMPLETED | OUTPATIENT
Start: 2024-11-15 | End: 2024-11-15

## 2024-11-15 RX ORDER — CARBOPROST TROMETHAMINE 250 UG/ML
250 INJECTION, SOLUTION INTRAMUSCULAR AS NEEDED
Status: DISCONTINUED | OUTPATIENT
Start: 2024-11-15 | End: 2024-11-15

## 2024-11-15 RX ORDER — TRANEXAMIC ACID 100 MG/ML
INJECTION, SOLUTION INTRAVENOUS AS NEEDED
Status: DISCONTINUED | OUTPATIENT
Start: 2024-11-15 | End: 2024-11-15 | Stop reason: SURG

## 2024-11-15 RX ORDER — POLYETHYLENE GLYCOL 3350 17 G/17G
17 POWDER, FOR SOLUTION ORAL DAILY
Status: DISCONTINUED | OUTPATIENT
Start: 2024-11-16 | End: 2024-11-19 | Stop reason: HOSPADM

## 2024-11-15 RX ORDER — SODIUM CHLORIDE 9 MG/ML
40 INJECTION, SOLUTION INTRAVENOUS AS NEEDED
Status: DISCONTINUED | OUTPATIENT
Start: 2024-11-15 | End: 2024-11-15

## 2024-11-15 RX ORDER — OXYTOCIN/0.9 % SODIUM CHLORIDE 30/500 ML
999 PLASTIC BAG, INJECTION (ML) INTRAVENOUS ONCE
Status: COMPLETED | OUTPATIENT
Start: 2024-11-15 | End: 2024-11-15

## 2024-11-15 RX ORDER — MORPHINE SULFATE 0.5 MG/ML
INJECTION, SOLUTION EPIDURAL; INTRATHECAL; INTRAVENOUS AS NEEDED
Status: DISCONTINUED | OUTPATIENT
Start: 2024-11-15 | End: 2024-11-15 | Stop reason: SURG

## 2024-11-15 RX ORDER — ONDANSETRON 2 MG/ML
4 INJECTION INTRAMUSCULAR; INTRAVENOUS ONCE AS NEEDED
Status: DISCONTINUED | OUTPATIENT
Start: 2024-11-15 | End: 2024-11-19 | Stop reason: HOSPADM

## 2024-11-15 RX ORDER — MAGNESIUM HYDROXIDE 1200 MG/15ML
1000 LIQUID ORAL ONCE AS NEEDED
Status: DISCONTINUED | OUTPATIENT
Start: 2024-11-15 | End: 2024-11-15

## 2024-11-15 RX ORDER — ONDANSETRON 2 MG/ML
4 INJECTION INTRAMUSCULAR; INTRAVENOUS EVERY 6 HOURS PRN
Status: DISCONTINUED | OUTPATIENT
Start: 2024-11-15 | End: 2024-11-19 | Stop reason: HOSPADM

## 2024-11-15 RX ORDER — OXYTOCIN/0.9 % SODIUM CHLORIDE 30/500 ML
250 PLASTIC BAG, INJECTION (ML) INTRAVENOUS CONTINUOUS
Status: DISPENSED | OUTPATIENT
Start: 2024-11-15 | End: 2024-11-15

## 2024-11-15 RX ORDER — OXYCODONE HYDROCHLORIDE 10 MG/1
5 TABLET ORAL EVERY 4 HOURS PRN
Status: DISCONTINUED | OUTPATIENT
Start: 2024-11-15 | End: 2024-11-19 | Stop reason: HOSPADM

## 2024-11-15 RX ORDER — LIDOCAINE HYDROCHLORIDE 10 MG/ML
0.5 INJECTION, SOLUTION INFILTRATION; PERINEURAL ONCE AS NEEDED
Status: DISCONTINUED | OUTPATIENT
Start: 2024-11-15 | End: 2024-11-15

## 2024-11-15 RX ORDER — SODIUM CHLORIDE 0.9 % (FLUSH) 0.9 %
10 SYRINGE (ML) INJECTION EVERY 12 HOURS SCHEDULED
Status: DISCONTINUED | OUTPATIENT
Start: 2024-11-15 | End: 2024-11-15

## 2024-11-15 RX ORDER — BUPIVACAINE HYDROCHLORIDE 7.5 MG/ML
INJECTION, SOLUTION EPIDURAL; RETROBULBAR AS NEEDED
Status: DISCONTINUED | OUTPATIENT
Start: 2024-11-15 | End: 2024-11-15 | Stop reason: SURG

## 2024-11-15 RX ORDER — EPHEDRINE SULFATE 5 MG/ML
INJECTION INTRAVENOUS AS NEEDED
Status: DISCONTINUED | OUTPATIENT
Start: 2024-11-15 | End: 2024-11-15 | Stop reason: SURG

## 2024-11-15 RX ORDER — CEFAZOLIN SODIUM 1 G/3ML
INJECTION, POWDER, FOR SOLUTION INTRAMUSCULAR; INTRAVENOUS AS NEEDED
Status: DISCONTINUED | OUTPATIENT
Start: 2024-11-15 | End: 2024-11-15 | Stop reason: SURG

## 2024-11-15 RX ORDER — OXYCODONE HYDROCHLORIDE 10 MG/1
10 TABLET ORAL EVERY 4 HOURS PRN
Status: DISCONTINUED | OUTPATIENT
Start: 2024-11-15 | End: 2024-11-19 | Stop reason: HOSPADM

## 2024-11-15 RX ORDER — SIMETHICONE 80 MG
80 TABLET,CHEWABLE ORAL 4 TIMES DAILY PRN
Status: DISCONTINUED | OUTPATIENT
Start: 2024-11-15 | End: 2024-11-19 | Stop reason: HOSPADM

## 2024-11-15 RX ORDER — MORPHINE SULFATE 2 MG/ML
2 INJECTION, SOLUTION INTRAMUSCULAR; INTRAVENOUS
Status: DISCONTINUED | OUTPATIENT
Start: 2024-11-15 | End: 2024-11-19 | Stop reason: HOSPADM

## 2024-11-15 RX ORDER — ONDANSETRON 4 MG/1
4 TABLET, ORALLY DISINTEGRATING ORAL EVERY 6 HOURS PRN
Status: DISCONTINUED | OUTPATIENT
Start: 2024-11-15 | End: 2024-11-19 | Stop reason: HOSPADM

## 2024-11-15 RX ORDER — SODIUM CHLORIDE 0.9 % (FLUSH) 0.9 %
1-10 SYRINGE (ML) INJECTION AS NEEDED
Status: DISCONTINUED | OUTPATIENT
Start: 2024-11-15 | End: 2024-11-15

## 2024-11-15 RX ORDER — SODIUM CHLORIDE 0.9 % (FLUSH) 0.9 %
10 SYRINGE (ML) INJECTION AS NEEDED
Status: DISCONTINUED | OUTPATIENT
Start: 2024-11-15 | End: 2024-11-15

## 2024-11-15 RX ORDER — METOCLOPRAMIDE 10 MG/1
10 TABLET ORAL ONCE
Status: COMPLETED | OUTPATIENT
Start: 2024-11-15 | End: 2024-11-15

## 2024-11-15 RX ORDER — PROMETHAZINE HYDROCHLORIDE 25 MG/1
12.5 TABLET ORAL EVERY 4 HOURS PRN
Status: DISCONTINUED | OUTPATIENT
Start: 2024-11-15 | End: 2024-11-19 | Stop reason: HOSPADM

## 2024-11-15 RX ORDER — CITRIC ACID/SODIUM CITRATE 334-500MG
30 SOLUTION, ORAL ORAL ONCE
Status: COMPLETED | OUTPATIENT
Start: 2024-11-15 | End: 2024-11-15

## 2024-11-15 RX ORDER — HYDROCORTISONE 25 MG/G
CREAM TOPICAL 3 TIMES DAILY PRN
Status: DISCONTINUED | OUTPATIENT
Start: 2024-11-15 | End: 2024-11-19 | Stop reason: HOSPADM

## 2024-11-15 RX ORDER — MAGNESIUM HYDROXIDE 1200 MG/15ML
LIQUID ORAL AS NEEDED
Status: DISCONTINUED | OUTPATIENT
Start: 2024-11-15 | End: 2024-11-19 | Stop reason: HOSPADM

## 2024-11-15 RX ORDER — ONDANSETRON 2 MG/ML
INJECTION INTRAMUSCULAR; INTRAVENOUS AS NEEDED
Status: DISCONTINUED | OUTPATIENT
Start: 2024-11-15 | End: 2024-11-15 | Stop reason: SURG

## 2024-11-15 RX ORDER — NALOXONE HCL 0.4 MG/ML
0.1 VIAL (ML) INJECTION
Status: ACTIVE | OUTPATIENT
Start: 2024-11-15 | End: 2024-11-16

## 2024-11-15 RX ORDER — TRANEXAMIC ACID 10 MG/ML
1000 INJECTION, SOLUTION INTRAVENOUS EVERY 8 HOURS
Status: COMPLETED | OUTPATIENT
Start: 2024-11-15 | End: 2024-11-16

## 2024-11-15 RX ORDER — OXYTOCIN/0.9 % SODIUM CHLORIDE 30/500 ML
125 PLASTIC BAG, INJECTION (ML) INTRAVENOUS ONCE AS NEEDED
Status: DISCONTINUED | OUTPATIENT
Start: 2024-11-15 | End: 2024-11-19 | Stop reason: HOSPADM

## 2024-11-15 RX ORDER — SODIUM CHLORIDE, SODIUM LACTATE, POTASSIUM CHLORIDE, CALCIUM CHLORIDE 600; 310; 30; 20 MG/100ML; MG/100ML; MG/100ML; MG/100ML
INJECTION, SOLUTION INTRAVENOUS CONTINUOUS PRN
Status: DISCONTINUED | OUTPATIENT
Start: 2024-11-15 | End: 2024-11-15 | Stop reason: SURG

## 2024-11-15 RX ADMIN — BUPIVACAINE HYDROCHLORIDE 2 ML: 7.5 INJECTION, SOLUTION EPIDURAL; RETROBULBAR at 16:09

## 2024-11-15 RX ADMIN — SODIUM CHLORIDE, POTASSIUM CHLORIDE, SODIUM LACTATE AND CALCIUM CHLORIDE: 600; 310; 30; 20 INJECTION, SOLUTION INTRAVENOUS at 17:27

## 2024-11-15 RX ADMIN — EPHEDRINE SULFATE 10 MG: 5 INJECTION INTRAVENOUS at 16:21

## 2024-11-15 RX ADMIN — KETOROLAC TROMETHAMINE 30 MG: 30 INJECTION, SOLUTION INTRAMUSCULAR; INTRAVENOUS at 18:51

## 2024-11-15 RX ADMIN — ONDANSETRON 4 MG: 2 INJECTION INTRAMUSCULAR; INTRAVENOUS at 16:12

## 2024-11-15 RX ADMIN — Medication 30 UNITS: at 16:33

## 2024-11-15 RX ADMIN — NIFEDIPINE 60 MG: 30 TABLET, FILM COATED, EXTENDED RELEASE ORAL at 20:13

## 2024-11-15 RX ADMIN — TRANEXAMIC ACID 1000 MG: 1 INJECTION, SOLUTION INTRAVENOUS at 17:13

## 2024-11-15 RX ADMIN — CEFOXITIN 2000 MG: 2 INJECTION, POWDER, FOR SOLUTION INTRAVENOUS at 22:52

## 2024-11-15 RX ADMIN — TRANEXAMIC ACID 1000 MG: 10 INJECTION, SOLUTION INTRAVENOUS at 21:48

## 2024-11-15 RX ADMIN — SODIUM CHLORIDE, POTASSIUM CHLORIDE, SODIUM LACTATE AND CALCIUM CHLORIDE: 600; 310; 30; 20 INJECTION, SOLUTION INTRAVENOUS at 15:54

## 2024-11-15 RX ADMIN — FAMOTIDINE 20 MG: 20 TABLET ORAL at 08:30

## 2024-11-15 RX ADMIN — PRENATAL VITAMINS-IRON FUMARATE 27 MG IRON-FOLIC ACID 0.8 MG TABLET 1 TABLET: at 09:38

## 2024-11-15 RX ADMIN — SODIUM CHLORIDE, POTASSIUM CHLORIDE, SODIUM LACTATE AND CALCIUM CHLORIDE 1000 ML: 600; 310; 30; 20 INJECTION, SOLUTION INTRAVENOUS at 14:14

## 2024-11-15 RX ADMIN — CEFAZOLIN 2 G: 1 INJECTION, POWDER, FOR SOLUTION INTRAMUSCULAR; INTRAVENOUS; PARENTERAL at 15:53

## 2024-11-15 RX ADMIN — FLUOXETINE HYDROCHLORIDE 10 MG: 10 CAPSULE ORAL at 09:37

## 2024-11-15 RX ADMIN — SODIUM CITRATE AND CITRIC ACID MONOHYDRATE 30 ML: 500; 334 SOLUTION ORAL at 15:50

## 2024-11-15 RX ADMIN — METOCLOPRAMIDE 10 MG: 10 TABLET ORAL at 21:48

## 2024-11-15 RX ADMIN — MORPHINE SULFATE 0.2 MG: 0.5 INJECTION EPIDURAL; INTRATHECAL; INTRAVENOUS at 16:09

## 2024-11-15 RX ADMIN — BUSPIRONE HYDROCHLORIDE 15 MG: 5 TABLET ORAL at 09:38

## 2024-11-15 RX ADMIN — Medication 30 UNITS: at 17:28

## 2024-11-15 RX ADMIN — DOCUSATE SODIUM 100 MG: 100 CAPSULE, LIQUID FILLED ORAL at 09:38

## 2024-11-15 RX ADMIN — MORPHINE SULFATE 4.8 MG: 0.5 INJECTION, SOLUTION EPIDURAL; INTRATHECAL; INTRAVENOUS at 16:36

## 2024-11-15 RX ADMIN — SODIUM CHLORIDE, POTASSIUM CHLORIDE, SODIUM LACTATE AND CALCIUM CHLORIDE 125 ML/HR: 600; 310; 30; 20 INJECTION, SOLUTION INTRAVENOUS at 13:05

## 2024-11-15 RX ADMIN — NIFEDIPINE 60 MG: 30 TABLET, EXTENDED RELEASE ORAL at 09:37

## 2024-11-15 NOTE — ANESTHESIA PROCEDURE NOTES
Intrathecal Block      Patient reassessed immediately prior to procedure    Patient location during procedure: OB  Start Time: 11/15/2024 4:07 PM  Stop Time: 11/15/2024 4:10 PM  Indication:at surgeon's request  Performed By  Anesthesiologist: Thai Rosales MD  Preanesthetic Checklist  Completed: patient identified, site marked, surgical consent, pre-op evaluation, timeout performed, IV checked, risks and benefits discussed and monitors and equipment checked  Intrathecal Block Prep:  Pt Position:sitting  Sterile Tech:sterile barrier, gloves, cap, gown and mask  Prep:Betadine  Monitoring:blood pressure monitoring, continuous pulse oximetry and EKG  Intrathecal Block Procedure:  Approach:midline  Guidance:landmark technique and palpation technique  Location:L4-L5  Needle Type:Pencan  Needle Gauge:24 G  Placement of Needle Event: cerebrospinal fluid  Fluid Appearance:clear  Post Assessment:  Patient Tolerance:patient tolerated the procedure well with no apparent complications  Complications:no

## 2024-11-15 NOTE — PROGRESS NOTES
Chief complaint   Chief Complaint   Patient presents with    Non-stress Test     PT WAS SEEN IN OB OFFICE TODAY. HAD NST. PT HAS HX OF PRE E WITH LAST PREGANCY BPP WAS DONE WITH SCORE OF 4/8.        History of Present Illness: Patient is a 34 y.o. female who presents with IUP at 36w0d weeks gestation. G 2, P 0101. BPP 4 of 8       Past Medical History:   Diagnosis Date    Allergic     Anxiety     Depression     Heart murmur     Heartburn during pregnancy     Hypertension affecting pregnancy     Kidney stone     Kidney stones     Otitis media     Polycystic ovary syndrome     Preeclampsia     PUPP (pruritic urticarial papules and plaques of pregnancy)     WITH FIRST PREGNANCY    Sinusitis     Urinary tract infection      Blood Type: O   Fetal Gender: Male  GBS: Negative    Past Surgical History:   Procedure Laterality Date     SECTION Bilateral 2021    Procedure:  SECTION PRIMARY;  Surgeon: Richard Dumont III, MD;  Location: [facility];  Service: Obstetrics/Gynecology;  Laterality: Bilateral;    CYSTOSCOPY URETEROSCOPY LASER LITHOTRIPSY      KIDNEY STONE SURGERY Right 2017    MOUTH SURGERY      UPPER AND LOWER    SEPTOPLASTY      WISDOM TOOTH EXTRACTION       Family History   Problem Relation Age of Onset    Nephrolithiasis Mother     Hypertension Mother     Heart disease Mother     Melanoma Mother     Anxiety disorder Mother     Heart murmur Mother     Nephrolithiasis Father     Hypertension Brother     Nephrolithiasis Brother     Hypertension Maternal Aunt     Heart disease Maternal Grandmother     Diabetes Maternal Grandmother     Heart disease Maternal Grandfather     Heart failure Maternal Grandfather     Heart attack Maternal Grandfather     Anxiety disorder Paternal Grandmother     Heart failure Paternal Grandmother     Melanoma Paternal Grandmother     Heart attack Paternal Grandfather     Heart disease Paternal Grandfather     Alcohol abuse Paternal Grandfather       Social History     Tobacco Use    Smoking status: Never    Smokeless tobacco: Never   Vaping Use    Vaping status: Never Used   Substance Use Topics    Alcohol use: Not Currently    Drug use: No     Medications Prior to Admission   Medication Sig Dispense Refill Last Dose/Taking    aspirin 81 MG chewable tablet Chew Daily.   11/14/2024 at  7:00 AM    busPIRone (BUSPAR) 15 MG tablet Take 1 tablet by mouth 3 (Three) Times a Day.   11/14/2024 at  7:00 AM    docusate sodium (COLACE) 100 MG capsule Take 1 capsule by mouth 2 (Two) Times a Day.   11/14/2024 at  7:00 AM    Famotidine (PEPCID AC PO) Take 20 mg by mouth 2 (Two) Times a Day.   11/14/2024 at  7:00 AM    FLUoxetine (PROzac) 10 MG capsule Take 1 capsule by mouth Daily.   11/14/2024 at  7:00 AM    NIFEdipine XL (PROCARDIA XL) 30 MG 24 hr tablet Take 1 tablet by mouth Daily.   11/14/2024 at  7:00 AM    Prenatal Vit-Fe Fumarate-FA (prenatal vitamin 27-0.8) 27-0.8 MG tablet tablet Take 1 tablet by mouth Daily.   11/14/2024 at  7:00 AM     Allergies:  Cinnamon and Red dye #40 (allura red)      Vital Signs  Temp:  [97.9 °F (36.6 °C)] 97.9 °F (36.6 °C)  Heart Rate:  [] 87  Resp:  [18] 18  BP: (143-160)/(80-98) 146/88    Radiology  Imaging Results (Last 24 Hours)       Procedure Component Value Units Date/Time    US Fetal Biophysical Profile;Without Non-Stress Testing [[id number]] Resulted: 11/14/24 1818     Updated: 11/14/24 1837            Labs  Lab Results (last 24 hours)       Procedure Component Value Units Date/Time    Chlamydia trachomatis, Neisseria gonorrhoeae, PCR w/ confirmation - Swab, Vagina [[id number]] Resulted: 05/07/24    Specimen: Swab from Vagina Updated: 11/14/24 1807     External Chlamydia Screen Negative    Gonorrhea Screen - Swab, [[id number]] Resulted: 05/07/24    Specimen: Swab Updated: 11/14/24 1807     External Gonorrhea Screen Negative    Hepatitis B Surface Antigen [[id number]] Resulted: 05/07/24    Specimen: Blood Updated: 11/14/24  1807     External Hepatitis B Surface Ag Negative    Rubella Antibody, IgG [[id number]] Resulted: 05/07/24    Specimen: Blood Updated: 11/14/24 1807     External Rubella Qual Immune    HIV-1 Antibody, EIA [[id number]] Resulted: 05/07/24    Specimen: Blood Updated: 11/14/24 1807     External HIV Antibody Non-Reactive              Review of Systems    The following systems were reviewed and negative;  ENT, respiratory, cardiovascular, gastrointestinal, genitourinary, breast, endocrine and allergies / immunologic.      Physical Exam:      General Appearance:    Alert, cooperative, in no acute distress   Head:    Normocephalic, without obvious abnormality, atraumatic   Eyes:            Lids and lashes normal, conjunctivae and sclerae normal, no   icterus, no pallor, corneas clear, PERRLA   Ears:    Ears appear intact with no abnormalities noted   Throat:   No oral lesions, no thrush, oral mucosa moist   Neck:   No adenopathy, supple, trachea midline, no thyromegaly, no     carotid bruit, no JVD   Back:     No kyphosis present, no scoliosis present, no skin lesions,       erythema or scars, no tenderness to percussion or                   palpation,   range of motion normal   Lungs:     Clear to auscultation,respirations regular, even and                   unlabored    Heart:    Regular rhythm and normal rate, normal S1 and S2, no            murmur, no gallop, no rub, no click   Breast Exam:    Deferred   Abdomen:     Normal bowel sounds, no masses, no organomegaly, soft        non-tender, non-distended, no guarding, no rebound                 tenderness   Genitalia:    Deferred   Extremities:   Moves all extremities well, no edema, no cyanosis, no              redness   Pulses:   Pulses palpable and equal bilaterally   Skin:   No bleeding, bruising or rash   Lymph nodes:   No palpable adenopathy   Neurologic:   Cranial nerves 2 - 12 grossly intact, sensation intact, DTR        present and equal bilaterally          Assessment: Patient is a 34 y.o. female who presents with IUP at 36w0d weeks gestation. G 2, P 0101. BPP 4 of 8 in office.      Chief Complaint   Patient presents with    Non-stress Test     PT WAS SEEN IN OB OFFICE TODAY. HAD NST. PT HAS HX OF PRE E WITH LAST PREGANCY BPP WAS DONE WITH SCORE OF 4/8.        Plan of Care: Admit. Proceed with 24 our observation. Repeat BPP in       Richard Dumont III, MD  11/14/24  19:57 EST

## 2024-11-15 NOTE — ANESTHESIA PREPROCEDURE EVALUATION
Anesthesia Evaluation     Patient summary reviewed and Nursing notes reviewed   no history of anesthetic complications:   NPO Solid Status: > 8 hours  NPO Liquid Status: > 2 hours           Airway   Mallampati: II  TM distance: >3 FB  Neck ROM: full  Dental      Pulmonary - negative pulmonary ROS    breath sounds clear to auscultation  Cardiovascular   Exercise tolerance: good (4-7 METS)    Rhythm: regular  Rate: normal    (+) hypertension, valvular problems/murmurs      Neuro/Psych  (+) seizures, psychiatric history  GI/Hepatic/Renal/Endo    (+) renal disease- stones    Musculoskeletal     (+) back pain  Abdominal    Substance History - negative use     OB/GYN    (+) Pregnant, Preeclampsia, pregnancy induced hypertension        Other                    Anesthesia Plan    ASA 3     spinal and ITN     intravenous induction     Anesthetic plan, risks, benefits, and alternatives have been provided, discussed and informed consent has been obtained with: patient.  Pre-procedure education provided  Use of blood products discussed with  Consented to blood products.    Plan discussed with CRNA.    CODE STATUS:    Level Of Support Discussed With: Patient  Code Status (Patient has no pulse and is not breathing): CPR (Attempt to Resuscitate)  Medical Interventions (Patient has pulse or is breathing): Full Support

## 2024-11-15 NOTE — NON STRESS TEST
Evi Hare, a  at 36w1d with an JAMES of 2024, by Last Menstrual Period, was seen at [facility] LABOR DELIVERY for a nonstress test.    Chief Complaint   Patient presents with    Non-stress Test     PT WAS SEEN IN OB OFFICE TODAY. HAD NST. PT HAS HX OF PRE E WITH LAST PREGANCY BPP WAS DONE WITH SCORE OF 4/8.        Patient Active Problem List   Diagnosis    Pregnant    Pregnancy    Hypertension affecting pregnancy    Elevated blood pressure reading    Eclampsia complicating pregnancy, third trimester    IUP (intrauterine pregnancy), incidental    Postpartum care following  delivery    Back pain    NST (non-stress test) reactive    Decreased fetal movement       Start Time: 0700  Stop Time: 0720    Interpretation A  Nonstress Test Interpretation A: Reactive  Comments A: VERIFIED BY SETH GUZMAN RN

## 2024-11-15 NOTE — OP NOTE
Repeat Low Transverse  Section, Extensive Lysis of Adhesions, Partial Omentectomy  Evi Hare  11/15/2024  36w1d    Pre-op Diagnosis:   EFRAIN  Previous  Section    Post-op Diagnosis:     BELA  Severe Adhesive Disease  Uterine Fibroid    Procedure(s):   SECTION REPEAT  PARTIAL OMENTECTOMY  LYSIS OF ADHESIONS     Surgeon(s):  Osiris Bobo DO    Anesthesia: Spinal    Staff:   Circulator: María Ball RN  Scrub Person: Yareli Flores  Baby Nurse: Jerica Gregory RN  Assistant: Joann Connell    Estimated Blood Loss:  850 CC    Urine Output:: 50 mL    IVF: Intravenous fluids were administered, lactated Ringer's    I/O this shift:  In: 1000 [I.V.:1000]  Out: 850 [Urine:50; Blood:800]    Grafts/implants: None    Procedure   The patient was placed in the dorsal supine position.  She was then prepped and draped in the normal sterile fashion.  Spinal anesthesia was found to be adequate.  A pfannenstiel skin incision was made with the scalpel and carried down to the underlying layer of fascia.  The fascia was then incised and extended bilaterally with the ireland scissors.  The superior aspect of this incision was grasped with kocher clamps x 2, tented upward and dissected off with ireland scissors.  The clamps were removed and placed on the inferior aspect of the incision, tented upward and dissected off the rectus muscle to the pubic simphysis.  The rectus muscle was then  in the midline.   The omentum was already noted to be adhered to the peritoneum and the rectus muscle.  I was able to find an area of peritoneum superiorly this was grasped with hemostat x 2 and entered sharply Metzenbaum scissors and then bluntly extended.  The omentum was adhered densely to the entire uterus all the way down to the lower uterine segment.  I had to use the surgeon's finger to undermine the omentum and to Bovie the omentum away from the lower uterine segment to provide a window.  A transverse  uterine incision was then made with the scalpel and extended in a cranio-caudal  Blunt fashion.  Bladder blade was removed. The infants head was then grasped and brought through the hysterotomy.  The mouth and nares was bulb suctioned.  The shoulders and body were then delivered atraumatically.  The cord was then doubly clamped and cut and the infant was handed off to the nursing staff with vigorous cry and movement of all extremities.  Cord blood was obtained.  The placenta was manually extracted.  The uterus was able to be exteriorized and cleared of all clot and debris.  The hysterotomy was grasped with Allis clamps at the edge and repaired with 1-0 Monocryl in a running and locked single layer fashion.  At this point there was still remaining omentum adhered to the anterior uterine wall and all along the right peritoneal surface.  I was able to free this omentum and up from the uterus and the peritoneum.  I then ran the omentum and noted a large defect in the middle of the omentum of approximately 7 cm in size.  I elected to use a Enseal impact to perform a partial omentectomy to prevent bowel obstruction in the future.  The posterior cul-de-sac was irrigated and suctioned.  The uterus was hemostatic and returned to the abdomen.  A 2 cm anterior uterine fibroid was noted subserosally on the uterus.  The gutters were cleared of all clot and debris.  Second look at the uterine incision was hemostatic.  The rectus muscle was visualized and hemostatic.  They were then reapproximated with a 0 Vicryl suture in a running fashion.  There was a small amount of bleeding from a small perforated vessel at the rectus muscle just under the superior fascial incision.  This was ligated with a 3-0 chromic stitch and Emiliano was placed over it for improved hemostasis.  The fascia was then grasped with kocher clamps and re-approximated with 0 vicryl in a single layer and running fashion.    The subcutaneous layer was irrigated and  hemostatic with Bovie cautery.  This layer was then re-approximated with 2-0 plain gut stitch.  The skin was re-approximated with 3-0 vicryl on a robyn needle.  Sterile dressing was applied to the incision.  Uterus was found to be firm and at the umbilicus.  Sponge, lap and needle counts were correct.  Pt taken to recovery in stable condition.     APGARS  7   9            GENDER  MALE  Complications: None      Osiris Bobo DO     Date: 11/15/2024  Time: 18:03 EST

## 2024-11-15 NOTE — H&P
"ARELIS Yuan  Obstetric History and Physical    Chief Complaint   Patient presents with    Non-stress Test     PT WAS SEEN IN OB OFFICE TODAY. HAD NST. PT HAS HX OF PRE E WITH LAST PREGANCY BPP WAS DONE WITH SCORE OF .        Subjective     Patient is a 34 y.o. female  currently at 36w1d, who presented from the office yesterday for BPP .  She has CHTN and a recent urine protein of 299.  /98 yesterday and this am 174/87 while I am in the room and pt at rest. Previous c/s for repeat. Denies HA, scotomata, N/V.  Repeat BPP 10/10 today.      Her prenatal care is complicated by  hypertension  chronic hypertension and chronic hypertension with superimposed pre-eclampsia and prior   desires repeat .  Her previous obstetric/gynecological history is noted for is non-contributory.    The following portions of the patients history were reviewed and updated as appropriate: current medications, allergies, past medical history, past surgical history, past family history, past social history, and problem list .       Prenatal Information:   Maternal Prenatal Labs  Blood Type ABO Type   Date Value Ref Range Status   11/15/2024 O  Final      Rh Status RH type   Date Value Ref Range Status   11/15/2024 Positive  Final      Antibody Screen Antibody Screen   Date Value Ref Range Status   11/15/2024 Negative  Final      Rapid Urine Drug Screen No results found for: \"AMPMETHU\", \"BARBITSCNUR\", \"LABBENZSCN\", \"LABMETHSCN\", \"LABOPIASCN\", \"THCURSCR\", \"COCAINEUR\", \"AMPHETSCREEN\", \"PROPOXSCN\", \"BUPRENORSCNU\", \"METAMPSCNUR\", \"OXYCODONESCN\", \"TRICYCLICSCN\"   Group B Strep Culture No results found for: \"GBSANTIGEN\"           External Prenatal Results       Pregnancy Outside Results - Transcribed From Office Records - See Scanned Records For Details       Test Value Date Time    ABO  O  11/15/24 0753    Rh  Positive  11/15/24 0753    Antibody Screen  Negative  11/15/24 0753    Varicella IgG       Rubella ^ Immune  " 24     Hgb  13.2 g/dL 11/15/24 0753    Hct  39.3 % 11/15/24 0753    HgB A1c        1h GTT       3h GTT Fasting       3h GTT 1 hour       3h GTT 2 hour       3h GTT 3 hour        Gonorrhea (discrete) ^ Negative  24     Chlamydia (discrete) ^ Negative  24     RPR ^ Non Reactive  24 0943    Syphils cascade: TP-Ab (FTA)       TP-Ab       TP-Ab (EIA)       TPPA       HBsAg ^ Negative  24     Herpes Simplex Virus PCR       Herpes Simplex VIrus Culture       HIV ^ Non-Reactive  24     Hep C RNA Quant PCR       Hep C Antibody       AFP       NIPT       Cystic Fibrosis (Anushka)       Cystic Fibroisis        Spinal Muscular atrophy       Fragile X       Group B Strep       GBS Susceptibility to Clindamycin       GBS Susceptibility to Erythromycin       Fetal Fibronectin       Genetic Testing, Maternal Blood                 Drug Screening       Test Value Date Time    Urine Drug Screen       Amphetamine Screen       Barbiturate Screen       Benzodiazepine Screen       Methadone Screen       Phencyclidine Screen       Opiates Screen       THC Screen       Cocaine Screen       Propoxyphene Screen       Buprenorphine Screen       Methamphetamine Screen       Oxycodone Screen       Tricyclic Antidepressants Screen                 Legend    ^: Historical                              Past OB History:     OB History    Para Term  AB Living   2 1 0 1 0 1   SAB IAB Ectopic Molar Multiple Live Births   0 0 0 0 0 1      # Outcome Date GA Lbr Chandler/2nd Weight Sex Type Anes PTL Lv   2 Current            1  21 36w3d  3305 g (7 lb 4.6 oz) F CS-LTranv EPI, Spinal Y YUVAL      Name: ADALGISA ALEJONICOLAS      Apgar1: 8  Apgar5: 9       Past Medical History: Past Medical History:   Diagnosis Date    Allergic     Anxiety     Depression     Heart murmur     Heartburn during pregnancy     Hypertension affecting pregnancy     Kidney stone     Kidney stones     Otitis media     Polycystic  ovary syndrome     Preeclampsia     PUPP (pruritic urticarial papules and plaques of pregnancy)     WITH FIRST PREGNANCY    Sinusitis     Urinary tract infection       Past Surgical History Past Surgical History:   Procedure Laterality Date     SECTION Bilateral 2021    Procedure:  SECTION PRIMARY;  Surgeon: Richard Dumont III, MD;  Location: [facility] LABOR DELIVERY;  Service: Obstetrics/Gynecology;  Laterality: Bilateral;    CYSTOSCOPY URETEROSCOPY LASER LITHOTRIPSY      KIDNEY STONE SURGERY Right 2017    MOUTH SURGERY      UPPER AND LOWER    SEPTOPLASTY      WISDOM TOOTH EXTRACTION        Family History: Family History   Problem Relation Age of Onset    Nephrolithiasis Mother     Hypertension Mother     Heart disease Mother     Melanoma Mother     Anxiety disorder Mother     Heart murmur Mother     Nephrolithiasis Father     Hypertension Brother     Nephrolithiasis Brother     Hypertension Maternal Aunt     Heart disease Maternal Grandmother     Diabetes Maternal Grandmother     Heart disease Maternal Grandfather     Heart failure Maternal Grandfather     Heart attack Maternal Grandfather     Anxiety disorder Paternal Grandmother     Heart failure Paternal Grandmother     Melanoma Paternal Grandmother     Heart attack Paternal Grandfather     Heart disease Paternal Grandfather     Alcohol abuse Paternal Grandfather       Social History:  reports that she has never smoked. She has never been exposed to tobacco smoke. She has never used smokeless tobacco.   reports that she does not currently use alcohol.   reports no history of drug use.        Review of Systems                  Review of Systems   Pertinent items are noted in HPI, all other systems reviewed and negative      Objective     Vital Signs Range for the last 24 hours  Temperature: Temp:  [97.6 °F (36.4 °C)-97.9 °F (36.6 °C)] 97.6 °F (36.4 °C)   Temp Source: Temp src: Oral   BP: BP: (133-160)/(80-98) 138/84   Pulse: Heart Rate:   [] 92   Respirations: Resp:  [18-20] 20   Weight: Weight:  [108 kg (238 lb 6.4 oz)] 108 kg (238 lb 6.4 oz)     Physical Examination: General appearance - alert, well appearing, and in no distress  Chest - clear to auscultation, no wheezes, rales or rhonchi, symmetric air entry  Heart - normal rate, regular rhythm, normal S1, S2, no murmurs, rubs, clicks or gallops  Abdomen - Soft, gravid, nontender  Extremities - peripheral pulses normal, no pedal edema, no clubbing or cyanosis, no c/c, 1+ edema    Presentation: cephalic                         Assessment & Plan       Decreased fetal movement      Assessment & Plan    Assessment/Plan:  1.  Intrauterine pregnancy at 36w1d weeks gestation with reactive NST and BPP 10/10 with reassuring fetal status.    2.  CHTN likely EFRIAN now with elevated BP.  If persistently elevated will treat and proceed with delivery.  HELLP labs normal.  Procardia xl and baby ASA at home.   3. Previous c/s for repeat.  Risk and benefit reviewed with pt including  side effects of immaturity vs risk of Severe Pre-eclampsia reviewed.  Pt admits to understanding and agrees to above plan of care.    4. GBS is unknown.       Osiris Bobo DO  11/15/2024  09:32 EST

## 2024-11-16 LAB
HCT VFR BLD AUTO: 33.5 % (ref 34–46.6)
HGB BLD-MCNC: 11.1 G/DL (ref 12–15.9)
TREPONEMA PALLIDUM IGG+IGM AB [PRESENCE] IN SERUM OR PLASMA BY IMMUNOASSAY: NORMAL

## 2024-11-16 PROCEDURE — 25010000002 CEFOXITIN PER 1 G: Performed by: OBSTETRICS & GYNECOLOGY

## 2024-11-16 PROCEDURE — 85014 HEMATOCRIT: CPT | Performed by: OBSTETRICS & GYNECOLOGY

## 2024-11-16 PROCEDURE — 85018 HEMOGLOBIN: CPT | Performed by: OBSTETRICS & GYNECOLOGY

## 2024-11-16 RX ORDER — BUSPIRONE HYDROCHLORIDE 10 MG/1
15 TABLET ORAL EVERY 8 HOURS SCHEDULED
Status: DISCONTINUED | OUTPATIENT
Start: 2024-11-16 | End: 2024-11-19 | Stop reason: HOSPADM

## 2024-11-16 RX ORDER — FLUOXETINE 10 MG/1
10 CAPSULE ORAL DAILY
Status: DISCONTINUED | OUTPATIENT
Start: 2024-11-16 | End: 2024-11-19 | Stop reason: HOSPADM

## 2024-11-16 RX ADMIN — CEFOXITIN 2000 MG: 2 INJECTION, POWDER, FOR SOLUTION INTRAVENOUS at 06:09

## 2024-11-16 RX ADMIN — BUSPIRONE HYDROCHLORIDE 15 MG: 10 TABLET ORAL at 21:26

## 2024-11-16 RX ADMIN — NIFEDIPINE 60 MG: 30 TABLET, FILM COATED, EXTENDED RELEASE ORAL at 08:42

## 2024-11-16 RX ADMIN — POLYETHYLENE GLYCOL (3350) 17 G: 17 POWDER, FOR SOLUTION ORAL at 08:42

## 2024-11-16 RX ADMIN — OXYCODONE HYDROCHLORIDE 10 MG: 10 TABLET ORAL at 21:26

## 2024-11-16 RX ADMIN — FLUOXETINE HYDROCHLORIDE 10 MG: 10 CAPSULE ORAL at 18:15

## 2024-11-16 RX ADMIN — SIMETHICONE 80 MG: 80 TABLET, CHEWABLE ORAL at 17:39

## 2024-11-16 RX ADMIN — OXYCODONE HYDROCHLORIDE 5 MG: 10 TABLET ORAL at 17:39

## 2024-11-16 RX ADMIN — TRANEXAMIC ACID 1000 MG: 10 INJECTION, SOLUTION INTRAVENOUS at 04:36

## 2024-11-16 NOTE — PLAN OF CARE
Goal Outcome Evaluation:  Plan of Care Reviewed With: patient        Progress: improving  Outcome Evaluation: Vital signs stable. Pressure dressing in place. Lochia is light with no clots noted. Fundus is midline and firm without massage. Tolerating regular diet. Denies any current needs.

## 2024-11-16 NOTE — PLAN OF CARE
Problem: Adult Inpatient Plan of Care  Goal: Plan of Care Review  Outcome: Progressing  Flowsheets  Taken 11/16/2024 1234 by Kika Wade RN  Progress: improving  Taken 11/16/2024 0505 by Rahel Lyon RN  Outcome Evaluation: Vital signs stable. Pressure dressing in place. Lochia is light with no clots noted. Fundus is midline and firm without massage. Tolerating regular diet. Denies any current needs.  Plan of Care Reviewed With: patient  Goal: Patient-Specific Goal (Individualized)  Outcome: Progressing  Goal: Absence of Hospital-Acquired Illness or Injury  Outcome: Progressing  Intervention: Identify and Manage Fall Risk  Recent Flowsheet Documentation  Taken 11/16/2024 0930 by Kika Wade RN  Safety Promotion/Fall Prevention: safety round/check completed  Taken 11/16/2024 0840 by Kika Wade RN  Safety Promotion/Fall Prevention: safety round/check completed  Intervention: Prevent and Manage VTE (Venous Thromboembolism) Risk  Recent Flowsheet Documentation  Taken 11/16/2024 0840 by Kika Wade RN  VTE Prevention/Management:   bilateral   compression stockings on  Intervention: Prevent Infection  Recent Flowsheet Documentation  Taken 11/16/2024 0840 by Kika Wade RN  Infection Prevention:   personal protective equipment utilized   hand hygiene promoted   rest/sleep promoted   single patient room provided  Goal: Optimal Comfort and Wellbeing  Outcome: Progressing  Intervention: Monitor Pain and Promote Comfort  Recent Flowsheet Documentation  Taken 11/16/2024 0840 by Kika Wade RN  Pain Management Interventions: pain management plan reviewed with patient/caregiver  Intervention: Provide Person-Centered Care  Recent Flowsheet Documentation  Taken 11/16/2024 0840 by Kika Wade RN  Trust Relationship/Rapport:   care explained   choices provided   emotional support provided   questions answered   questions encouraged   reassurance provided   thoughts/feelings  acknowledged  Goal: Readiness for Transition of Care  Outcome: Progressing   Goal Outcome Evaluation:           Progress: improving

## 2024-11-16 NOTE — PROGRESS NOTES
" [facility]   PROGRESS NOTE    Post-Op Day 1 S/P   Subjective   Subjective  Patient reports:  Pain is controlled with prescription NSAID's including ibuprofen (Motrin) and narcotic analgesics including hydrocodone/acetaminophen (Lorcet, Lortab, Norco, Vicodin).  She is up out of bed this am. Tolerating diet. Tolerating po -- normal. Voiding - without difficulty; flatus reported..  Vaginal bleeding is as much as expected.    Objective    Objective:  Vital signs (most recent): Blood pressure 130/78, pulse 88, temperature 98.3 °F (36.8 °C), temperature source Oral, resp. rate 18, height 165.1 cm (65\"), weight 108 kg (238 lb 6.4 oz), last menstrual period 2024, SpO2 97%, currently breastfeeding.       Vitals: Vital Signs Range for the last 24 hours  Temperature: Temp:  [97.7 °F (36.5 °C)-98.5 °F (36.9 °C)] 98.3 °F (36.8 °C)   Temp Source: Temp src: Oral   BP: BP: (100-152)/(27-95) 130/78   Pulse: Heart Rate:  [] 88   Respirations: Resp:  [18-22] 18   Weight:              Physical Exam    Lungs clear to auscultation bilaterally   Abdomen Soft, ND, tender along incision. + BS   Incision  Clean, dry, intact   Extremities extremities normal, atraumatic, no cyanosis or edema         [unfilled]       Lab Results   Component Value Date    ABO O 11/15/2024    RH Positive 11/15/2024        Lab Results   Component Value Date    HGB 11.1 (L) 2024    HCT 33.5 (L) 2024       Assessment & Plan        Decreased fetal movement    Chronic hypertension with superimposed preeclampsia    Assessment & Plan    Assessment:    Evi Hare is Day 1  post-partum  , Low Transverse  .    CHTN - stable on Procardia 60xL  Plan:  continue postop care.      Osiris Bobo DO  24  12:40 EST    "

## 2024-11-16 NOTE — PLAN OF CARE
Goal Outcome Evaluation:  Plan of Care Reviewed With: patient        Progress: improving  Outcome Evaluation: POST  IN RECOVERY PERIOD. VSS. SMALL AMOUNT RUBRA LOCHIA. BREASTFEEDING INFANT.

## 2024-11-16 NOTE — PAYOR COMM NOTE
"  [facility]  JAKY STACK  PHONE  [phone]  FAX  [phone]  NPI:  [id number]                            Elza Alejo (34 y.o. Female)       Date of Birth   1990    Social Security Number       Address   [address] [address]    Home Phone   [phone]    MRN   [id number]       Religious   Religious    Marital Status                               Admission Date   11/14/24    Admission Type   Elective    Admitting Provider   Osiris Bobo DO    Attending Provider   Osiris Bobo DO    Department, Room/Bed   [facility], W246/1       Discharge Date       Discharge Disposition       Discharge Destination                                 Attending Provider: Osiris Bobo DO    Allergies: Cinnamon, Red Dye #40 (Allura Red)    Isolation: None   Infection: None   Code Status: CPR    Ht: 165.1 cm (65\")   Wt: 108 kg (238 lb 6.4 oz)    Admission Cmt: None   Principal Problem: Decreased fetal movement [O36.8190]                   Active Insurance as of 11/14/2024       Primary Coverage       Payor Plan Insurance Group Employer/Plan Group    ANTHEM BLUE CROSS ANTHEM BLUE CROSS BLUE SHIELD PPO [id number]       Payor Plan Address Payor Plan Phone Number Payor Plan Fax Number Effective Dates    [id number] [phone]  3/1/2024 - None Entered    [place] [address]         Subscriber Name Subscriber Birth Date Member ID       ELZA ALEJO 1990 [id number]                     Emergency Contacts        (Rel.) Home Phone Work Phone Mobile Phone    ROHITH ALEJO (Spouse) [phone] -- --    Rahel Singer (Mother) [phone] -- --                 History & Physical        Osiris Bobo DO at 11/15/24 0932          [facility]  Obstetric History and Physical    Chief Complaint   Patient presents with    Non-stress Test     PT WAS SEEN IN OB OFFICE TODAY. HAD NST. PT HAS HX OF PRE E WITH LAST PREGANCY BPP WAS DONE WITH " "SCORE OF 4/8.        Subjective    Patient is a 34 y.o. female  currently at 36w1d, who presented from the office yesterday for BPP .  She has CHTN and a recent urine protein of 299.  /98 yesterday and this am 174/87 while I am in the room and pt at rest. Previous c/s for repeat. Denies HA, estephanieomata, N/V.  Repeat BPP 10/10 today.      Her prenatal care is complicated by  hypertension  chronic hypertension and chronic hypertension with superimposed pre-eclampsia and prior   desires repeat .  Her previous obstetric/gynecological history is noted for is non-contributory.    The following portions of the patients history were reviewed and updated as appropriate: current medications, allergies, past medical history, past surgical history, past family history, past social history, and problem list .       Prenatal Information:   Maternal Prenatal Labs  Blood Type ABO Type   Date Value Ref Range Status   11/15/2024 O  Final      Rh Status RH type   Date Value Ref Range Status   11/15/2024 Positive  Final      Antibody Screen Antibody Screen   Date Value Ref Range Status   11/15/2024 Negative  Final      Rapid Urine Drug Screen No results found for: \"AMPMETHU\", \"BARBITSCNUR\", \"LABBENZSCN\", \"LABMETHSCN\", \"LABOPIASCN\", \"THCURSCR\", \"COCAINEUR\", \"AMPHETSCREEN\", \"PROPOXSCN\", \"BUPRENORSCNU\", \"METAMPSCNUR\", \"OXYCODONESCN\", \"TRICYCLICSCN\"   Group B Strep Culture No results found for: \"GBSANTIGEN\"           External Prenatal Results       Pregnancy Outside Results - Transcribed From Office Records - See Scanned Records For Details       Test Value Date Time    ABO  O  11/15/24 0753    Rh  Positive  11/15/24 0753    Antibody Screen  Negative  11/15/24 0753    Varicella IgG       Rubella ^ Immune  24     Hgb  13.2 g/dL 11/15/24 0753    Hct  39.3 % 11/15/24 0753    HgB A1c        1h GTT       3h GTT Fasting       3h GTT 1 hour       3h GTT 2 hour       3h GTT 3 hour        Gonorrhea (discrete) ^ " Negative  24     Chlamydia (discrete) ^ Negative  24     RPR ^ Non Reactive  24 0943    Syphils cascade: TP-Ab (FTA)       TP-Ab       TP-Ab (EIA)       TPPA       HBsAg ^ Negative  24     Herpes Simplex Virus PCR       Herpes Simplex VIrus Culture       HIV ^ Non-Reactive  24     Hep C RNA Quant PCR       Hep C Antibody       AFP       NIPT       Cystic Fibrosis (Anushka)       Cystic Fibroisis        Spinal Muscular atrophy       Fragile X       Group B Strep       GBS Susceptibility to Clindamycin       GBS Susceptibility to Erythromycin       Fetal Fibronectin       Genetic Testing, Maternal Blood                 Drug Screening       Test Value Date Time    Urine Drug Screen       Amphetamine Screen       Barbiturate Screen       Benzodiazepine Screen       Methadone Screen       Phencyclidine Screen       Opiates Screen       THC Screen       Cocaine Screen       Propoxyphene Screen       Buprenorphine Screen       Methamphetamine Screen       Oxycodone Screen       Tricyclic Antidepressants Screen                 Legend    ^: Historical                              Past OB History:     OB History    Para Term  AB Living   2 1 0 1 0 1   SAB IAB Ectopic Molar Multiple Live Births   0 0 0 0 0 1      # Outcome Date GA Lbr Chandler/2nd Weight Sex Type Anes PTL Lv   2 Current            1  21 36w3d  3305 g (7 lb 4.6 oz) F CS-LTranv EPI, Spinal Y YUVAL      Name: ARVIND ALEJO      Apgar1: 8  Apgar5: 9       Past Medical History: Past Medical History:   Diagnosis Date    Allergic     Anxiety     Depression     Heart murmur     Heartburn during pregnancy     Hypertension affecting pregnancy     Kidney stone     Kidney stones     Otitis media     Polycystic ovary syndrome     Preeclampsia     PUPP (pruritic urticarial papules and plaques of pregnancy)     WITH FIRST PREGNANCY    Sinusitis     Urinary tract infection       Past Surgical History Past Surgical  History:   Procedure Laterality Date     SECTION Bilateral 2021    Procedure:  SECTION PRIMARY;  Surgeon: Richard Dumont III, MD;  Location: [facility] LABOR DELIVERY;  Service: Obstetrics/Gynecology;  Laterality: Bilateral;    CYSTOSCOPY URETEROSCOPY LASER LITHOTRIPSY      KIDNEY STONE SURGERY Right 2017    MOUTH SURGERY      UPPER AND LOWER    SEPTOPLASTY      WISDOM TOOTH EXTRACTION        Family History: Family History   Problem Relation Age of Onset    Nephrolithiasis Mother     Hypertension Mother     Heart disease Mother     Melanoma Mother     Anxiety disorder Mother     Heart murmur Mother     Nephrolithiasis Father     Hypertension Brother     Nephrolithiasis Brother     Hypertension Maternal Aunt     Heart disease Maternal Grandmother     Diabetes Maternal Grandmother     Heart disease Maternal Grandfather     Heart failure Maternal Grandfather     Heart attack Maternal Grandfather     Anxiety disorder Paternal Grandmother     Heart failure Paternal Grandmother     Melanoma Paternal Grandmother     Heart attack Paternal Grandfather     Heart disease Paternal Grandfather     Alcohol abuse Paternal Grandfather       Social History:  reports that she has never smoked. She has never been exposed to tobacco smoke. She has never used smokeless tobacco.   reports that she does not currently use alcohol.   reports no history of drug use.        Review of Systems                  Review of Systems   Pertinent items are noted in HPI, all other systems reviewed and negative      Objective    Vital Signs Range for the last 24 hours  Temperature: Temp:  [97.6 °F (36.4 °C)-97.9 °F (36.6 °C)] 97.6 °F (36.4 °C)   Temp Source: Temp src: Oral   BP: BP: (133-160)/(80-98) 138/84   Pulse: Heart Rate:  [] 92   Respirations: Resp:  [18-20] 20   Weight: Weight:  [108 kg (238 lb 6.4 oz)] 108 kg (238 lb 6.4 oz)     Physical Examination: General appearance - alert, well appearing, and in no distress  Chest -  clear to auscultation, no wheezes, rales or rhonchi, symmetric air entry  Heart - normal rate, regular rhythm, normal S1, S2, no murmurs, rubs, clicks or gallops  Abdomen - Soft, gravid, nontender  Extremities - peripheral pulses normal, no pedal edema, no clubbing or cyanosis, no c/c, 1+ edema    Presentation: cephalic                         Assessment & Plan      Decreased fetal movement      Assessment & Plan    Assessment/Plan:  1.  Intrauterine pregnancy at 36w1d weeks gestation with reactive NST and BPP 10/10 with reassuring fetal status.    2.  CHTN likely EFRAIN now with elevated BP.  If persistently elevated will treat and proceed with delivery.  HELLP labs normal.  Procardia xl and baby ASA at home.   3. Previous c/s for repeat.  Risk and benefit reviewed with pt including  side effects of immaturity vs risk of Severe Pre-eclampsia reviewed.  Pt admits to understanding and agrees to above plan of care.    4. GBS is unknown.       Osiris Bobo DO  11/15/2024  09:32 EST    Electronically signed by Osiris Bobo DO at 11/15/24 0943       Current Facility-Administered Medications   Medication Dose Route Frequency Provider Last Rate Last Admin    Hydrocortisone (Perianal) (ANUSOL-HC) 2.5 % rectal cream   Rectal TID PRN Osiris Bobo DO        hydrOXYzine (ATARAX) tablet 50 mg  50 mg Oral Q6H PRN Osiris Bobo DO        morphine injection 2 mg  2 mg Intravenous Q1H PRN Alec Coronado CRNA        naloxone (NARCAN) injection 0.1 mg  0.1 mg Intravenous Q1H PRN Alec Coronado CRNA        NIFEdipine XL (PROCARDIA XL) 24 hr tablet 60 mg  60 mg Oral Q24H Osiris Bobo DO   60 mg at 24 0842    ondansetron (ZOFRAN) injection 4 mg  4 mg Intravenous Once PRN Alec Coronado CRNA        ondansetron ODT (ZOFRAN-ODT) disintegrating tablet 4 mg  4 mg Oral Q6H PRN Osiris Bobo DO        Or    ondansetron (ZOFRAN) injection 4 mg  4 mg Intravenous Q6H PRN Osiris Bobo DO         oxyCODONE (ROXICODONE) immediate release tablet 5 mg  5 mg Oral Q4H PRN Osiris Bobo, DO        Or    oxyCODONE (ROXICODONE) immediate release tablet 10 mg  10 mg Oral Q4H PRN Osiris Bobo E, DO        oxytocin (PITOCIN) 30 units in 0.9% sodium chloride 500 mL (premix)  125 mL/hr Intravenous Once PRN Osiris Bobo E, DO        polyethylene glycol (MIRALAX) packet 17 g  17 g Oral Daily Osirsi Bobo E, DO   17 g at 11/16/24 0842    promethazine (PHENERGAN) tablet 12.5 mg  12.5 mg Oral Q4H PRN Osiris Bobo, DO        simethicone (MYLICON) chewable tablet 80 mg  80 mg Oral 4x Daily PRN Osiris Bobo E, DO        sodium chloride (NS) irrigation solution    PRN Osiris Bobo, DO   1,000 mL at 11/15/24 1645     Orders (last 24 hrs)        Start     Ordered    11/16/24 0900  polyethylene glycol (MIRALAX) packet 17 g  Daily         11/15/24 2029    11/16/24 0800  Ambulate Patient  2 Times Daily      Comments: After anesthesia wears off.    11/15/24 2029    11/16/24 0600  Discontinue Indwelling Urinary Catheter in AM  Once         11/15/24 2029    11/16/24 0600  Hemoglobin & Hematocrit, Blood  Morning Draw         11/15/24 2029    11/16/24 0000  Discontinue IV  Once         11/15/24 2029    11/16/24 0000  Remove Abdominal Dressing  Once         11/15/24 2029    11/15/24 2200  Incentive spirometry RT  Every 2 Hours While Awake       11/15/24 2029    11/15/24 2115  metoclopramide (REGLAN) tablet 10 mg  Once         11/15/24 2029    11/15/24 2115  tranexamic acid 1000 mg in 100 mL 0.7% NaCl infusion (premix)  Every 8 Hours         11/15/24 2029    11/15/24 2115  cefOXItin (MEFOXIN) 2,000 mg in sodium chloride 0.9 % 100 mL IVPB-VTB  Every 8 Hours         11/15/24 2029    11/15/24 2030  Transfer Patient  Once         11/15/24 2029    11/15/24 2030  Code Status and Medical Interventions: CPR (Attempt to Resuscitate); Full  Continuous         11/15/24 2029    11/15/24 2030  Vital Signs Per Hospital Policy  Per  "Hospital Policy         11/15/24 2029    11/15/24 2030  Notify Physician  Until Discontinued         11/15/24 2029    11/15/24 2030  Up As Tolerated  Until Discontinued         11/15/24 2029    11/15/24 2030  Patient May Shower  Once        Comments: After anesthesia wears off and with assistance    11/15/24 2029    11/15/24 2030  Diet: Regular/House; Fluid Consistency: Thin (IDDSI 0)  Diet Effective Now         11/15/24 2029    11/15/24 2030  Advance Diet As Tolerated -Regular  Until Discontinued         11/15/24 2029    11/15/24 2030  I/O  Every Shift       11/15/24 2029    11/15/24 2030  Fundal and Lochia Check  Per Hospital Policy        Comments: Q 30 min x 2, Q 1 hr x 4, Q 4 hrs x 24 hrs, then Q shift.    11/15/24 2029    11/15/24 2030  Straight Catheterize  Once        Comments: May Straight Cath One Time As Need for Inability to Void  If Necessary to Cath a Second Time, Insert Indwelling Urinary Catheter & Leave in If Residual is Greater Than 150 mL    11/15/24 2029    11/15/24 2030  Continue Indwelling Urinary Catheter Already in Place  Once         11/15/24 2029    11/15/24 2030  Notify Provider if Bladder Distention Continues  Until Discontinued         11/15/24 2029    11/15/24 2030  Turn Cough Deep Breathe  Once         11/15/24 2029    11/15/24 2030  Encourage early intake of PO fluids  Continuous         11/15/24 2029    11/15/24 2030  Ambulate Patient 3-5 times per day (with or without Davis)  Every Shift       11/15/24 2029    11/15/24 2030  Apply Abdominal Binding Until Discontinued  Until Discontinued         11/15/24 2029    11/15/24 2030  \"If patient tolerates food and liquids after completion of second bag of Pitocin, saline lock IV and discontinue IV fluid infusions.  Once         11/15/24 2029    11/15/24 2030  Breast pump to bed  Once         11/15/24 2029    11/15/24 2030  If indicated -- Please administer RH Immunoglobulin based on results of cord blood evaluation and fetal screen lab " "tests, pharmacy to dispense  Continuous        Comments: See process instructions for reference range details.    11/15/24 2029    11/15/24 2030  Place Sequential Compression Device  Once         11/15/24 2029    11/15/24 2030  Maintain Sequential Compression Device  Continuous         11/15/24 2029    11/15/24 2029  ondansetron ODT (ZOFRAN-ODT) disintegrating tablet 4 mg  Every 6 Hours PRN        Placed in \"Or\" Linked Group    11/15/24 2029    11/15/24 2029  ondansetron (ZOFRAN) injection 4 mg  Every 6 Hours PRN        Placed in \"Or\" Linked Group    11/15/24 2029    11/15/24 2029  oxyCODONE (ROXICODONE) immediate release tablet 5 mg  Every 4 Hours PRN        Placed in \"Or\" Linked Group    11/15/24 2029    11/15/24 2029  oxyCODONE (ROXICODONE) immediate release tablet 10 mg  Every 4 Hours PRN        Placed in \"Or\" Linked Group    11/15/24 2029    11/15/24 2029  ondansetron (ZOFRAN) injection 4 mg  Once As Needed         11/15/24 2029    11/15/24 2029  naloxone (NARCAN) injection 0.1 mg  Every 1 Hour PRN         11/15/24 2029    11/15/24 2029  morphine injection 2 mg  Every 1 Hour PRN         11/15/24 2029    11/15/24 2029  oxytocin (PITOCIN) 30 units in 0.9% sodium chloride 500 mL (premix)  Once As Needed         11/15/24 2029    11/15/24 2029  simethicone (MYLICON) chewable tablet 80 mg  4 Times Daily PRN         11/15/24 2029    11/15/24 2029  promethazine (PHENERGAN) tablet 12.5 mg  Every 4 Hours PRN         11/15/24 2029    11/15/24 2029  hydrOXYzine (ATARAX) tablet 50 mg  Every 6 Hours PRN         11/15/24 2029    11/15/24 2029  Hydrocortisone (Perianal) (ANUSOL-HC) 2.5 % rectal cream  3 Times Daily PRN         11/15/24 2029    11/15/24 2000  NIFEdipine XL (PROCARDIA XL) 24 hr tablet 60 mg  Every 24 Hours Scheduled         11/15/24 1905    11/15/24 1915  ketorolac (TORADOL) injection 30 mg  Once         11/15/24 1815    11/15/24 1910  Urinalysis, Microscopic Only - Indwelling Urethral Catheter  Once         " 11/15/24 1909    11/15/24 1910  Urine Culture - Urine, Indwelling Urethral Catheter  Once         11/15/24 1909    11/15/24 1902  Urinary Catheter Care  Every Shift,   Status:  Canceled       11/15/24 1901    11/15/24 1902  Urinalysis With Culture If Indicated - Indwelling Urethral Catheter  STAT         11/15/24 1901    11/15/24 1900  Respirations  Every Hour,   Status:  Canceled      Comments: If respiratory rate is less than 10/min, notify the Anesthesiologist    11/15/24 1815    11/15/24 1816  Notify Provider (Specified)  Until Discontinued,   Status:  Canceled         11/15/24 1815    11/15/24 1816  Vital Signs Per Hospital Policy  Per Hospital Policy,   Status:  Canceled         11/15/24 1815    11/15/24 1816  Strict Bed Rest  Until Discontinued,   Status:  Canceled         11/15/24 1815    11/15/24 1816  Fundal & Lochia Check  Every Shift,   Status:  Canceled       11/15/24 1815    11/15/24 1816  Diet: Regular/House; Fluid Consistency: Thin (IDDSI 0)  Diet Effective Now,   Status:  Canceled         11/15/24 1815    11/15/24 1816  Advance Diet As Tolerated -  Until Discontinued,   Status:  Canceled         11/15/24 1815    11/15/24 1816  Oxygen Therapy- Nasal Cannula; 2 LPM  Continuous,   Status:  Canceled         11/15/24 1815    11/15/24 1816  If respiratory is less than 8/min, see Narcan order below. Notify Anesthesiologist STAT.  Until Discontinued,   Status:  Canceled         11/15/24 1815    11/15/24 1816  Blood Pressure and Pulse Every 4 Hours  Continuous,   Status:  Canceled         11/15/24 1815    11/15/24 1816  Activity & Removal of Davis Catheter, Per Obstetrician  Continuous,   Status:  Canceled         11/15/24 1815    11/15/24 1816  Notify Anesthesiologist for Any Questions / Problems  Continuous,   Status:  Canceled         11/15/24 1815    11/15/24 1816  Notify Anesthesia for Temp Over 101.4F  Continuous,   Status:  Canceled         11/15/24 1815    11/15/24 1816  Ambu Bag at Bedside   "Continuous,   Status:  Canceled         11/15/24 1815    11/15/24 1815  Fundal & Lochia Check  As Needed,   Status:  Canceled      Comments: Every 15 Minutes x4, Then Every 30 Minutes x2, Then Every Shift    11/15/24 1815    11/15/24 1815  carboprost (HEMABATE) injection 250 mcg  As Needed,   Status:  Discontinued         11/15/24 1815    11/15/24 1815  IV Site Care  As Needed,   Status:  Canceled       11/15/24 1815    11/15/24 1645  sodium chloride (NS) irrigation solution  As Needed         11/15/24 1716    11/15/24 1600  Vital Signs q 4 while awake  Every 4 Hours,   Status:  Canceled      Comments: While the patient is awake.    11/15/24 1207    11/15/24 1415  oxytocin (PITOCIN) 30 units in 0.9% sodium chloride 500 mL (premix)  Continuous        Placed in \"Followed by\" Linked Group    11/15/24 1304    11/15/24 1400  Sod Citrate-Citric Acid (BICITRA) oral solution 30 mL  Once         11/15/24 1312    11/15/24 1304  oxytocin (PITOCIN) 30 units in 0.9% sodium chloride 500 mL (premix)  Once        Placed in \"Followed by\" Linked Group    11/15/24 1304    11/15/24 1304  methylergonovine (METHERGINE) injection 200 mcg  Once As Needed,   Status:  Discontinued         11/15/24 1304    11/15/24 1304  miSOPROStol (CYTOTEC) tablet 600 mcg  As Needed,   Status:  Discontinued         11/15/24 1304    11/15/24 1300  sodium chloride 0.9 % flush 10 mL  Every 12 Hours Scheduled,   Status:  Discontinued         11/15/24 1207    11/15/24 1300  lactated ringers bolus 1,000 mL  Once         11/15/24 1207    11/15/24 1300  lactated ringers infusion  Continuous,   Status:  Discontinued         11/15/24 1207    11/15/24 1300  ceFAZolin 2000 mg IVPB in 100 mL NS (VTB)  Once,   Status:  Discontinued         11/15/24 1207    11/15/24 1208  Admit To Obstetrics Inpatient  Once         11/15/24 1208    11/15/24 1207  sodium chloride (NS) irrigation solution 1,000 mL  Once As Needed,   Status:  Discontinued         11/15/24 1207    11/15/24 " 1206  Code Status and Medical Interventions: CPR (Attempt to Resuscitate); Full Support  Continuous,   Status:  Canceled         11/15/24 1207    11/15/24 120  Obtain Informed Consent  Once,   Status:  Canceled         11/15/24 1207    11/15/24 120  Vital Signs Per Hospital Policy  Per Hospital Policy,   Status:  Canceled         11/15/24 1207    11/15/24 1206  Continuous Fetal Monitoring With NST on Admission and Prior to Initiation of Oxytocin.  Per Order Details,   Status:  Canceled        Comments: Continuous Fetal Monitoring With NST on Admission    11/15/24 1207    11/15/24 120  External Uterine Contraction Monitoring  Per Hospital Policy,   Status:  Canceled         11/15/24 1207    11/15/24 120  Notify Provider (Specified)  Until Discontinued,   Status:  Canceled         11/15/24 1207    11/15/24 120  Notify Provider of Tachysystole (Per Hospital Algorithm)  Until Discontinued,   Status:  Canceled         11/15/24 1207    11/15/24 120  Notify Provider if Membranes Ruptured, Bleeding Greater Than 1 Pad Per Hour, Fetal Heart Tone Abnormality or Severe Pain  Until Discontinued,   Status:  Canceled         11/15/24 1207    11/15/24 1206  Up as Tolerated  Until Discontinued,   Status:  Canceled         11/15/24 1207    11/15/24 120  Initiate Group Beta Strep (GBS) Prophylaxis Protocol, If Criteria Met  Continuous,   Status:  Canceled        Comments: NO TREATMENT RECOMMENDED IF: 1) Maternal GBS Status Known Negative 2) Scheduled  Birth With Intact Membranes, Not in Labor 3) Maternal GBS Status Unknown, No Risk Factors  TREAT WITH ANTIBIOTICS IF:  1) Maternal GBS Status Known Positive 2) Maternal GBS Status Unknown With Risk Factors: a)  Previous Infant Affected By GBS Infection b) GBS Urinary Tract Infection (UTI) or Bacteriuria During Pregnancy c) Unexplained Maternal Fever (100.4F (38C) or Greater) During Labor d)  Prolonged Rupture of Membranes (18 or More Hours) e) Gestational Age Less Than 37  "Weeks    11/15/24 1207    11/15/24 1206  Insert Indwelling Urinary Catheter  Once,   Status:  Canceled        Placed in \"And\" Linked Group    11/15/24 1207    11/15/24 1206  Assess Need for Indwelling Urinary Catheter - Follow Removal Protocol  Continuous,   Status:  Canceled        Comments: Indwelling Urinary Catheter Removal Criteria  Discontinue Indwelling Urinary Catheter Unless One of the Following is Present  Urinary Retention or Obstruction  Chronic Davis Catheter Use  End of Life  Critical Illness with Strict I/O   Tract or Abdominal Surgery  Stage 3/4 Sacral / Perineal Wound  Required Activity Restriction: Trauma  Required Activity Restriction: Spine Surgery  If Patient is Being Followed by Urology Contact Them PRIOR to Removal  Do Not Remove Indwelling Urinary Catheter Order is Present with a CLINICAL REASON to Maintain the Catheter. Provider is Required to Include a Clinical Reason to Maintain a Urinary Catheter    Chronic Davis Catheter Use (Present on Admission)  Assess for Continued Need & Document Medical Necessity  If Infection is Suspected, Contact the Provider       Placed in \"And\" Linked Group    11/15/24 1207    11/15/24 1206  Urinary Catheter Care  Every Shift,   Status:  Canceled      Placed in \"And\" Linked Group    11/15/24 1207    11/15/24 1206  Abdominal Prep With Clippers  Once,   Status:  Canceled         11/15/24 1207    11/15/24 1206  Chlorhexadine Skin Prep Unless Otherwise Indicated  Once,   Status:  Canceled         11/15/24 1207    11/15/24 1206  SCD (Sequential Compression Devices)  Once,   Status:  Canceled         11/15/24 1207    11/15/24 1206  POC Glucose Once  Once,   Status:  Canceled         11/15/24 1207    11/15/24 1206  Document Gatorade Consumption Prior to Admission (Yes or No)  Once,   Status:  Canceled         11/15/24 1207    11/15/24 1206  Inpatient Anesthesiology Consult  Once,   Status:  Canceled        Specialty:  Anesthesiology  Provider:  (Not yet assigned) "    11/15/24 1207    11/15/24 1206  Treponema pallidum AB w/Reflex RPR  STAT         11/15/24 1207    11/15/24 1206  Insert Peripheral IV  Once,   Status:  Canceled         11/15/24 1207    11/15/24 1206  Saline Lock & Maintain IV Access  Continuous,   Status:  Canceled         11/15/24 1207    11/15/24 1205  sodium chloride 0.9 % flush 10 mL  As Needed,   Status:  Discontinued         11/15/24 1207    11/15/24 1205  sodium chloride 0.9 % infusion 40 mL  As Needed,   Status:  Discontinued         11/15/24 1207    11/15/24 1205  lidocaine (XYLOCAINE) 1 % injection 0.5 mL  Once As Needed,   Status:  Discontinued         11/15/24 1207    11/15/24 1045  sodium chloride 0.9 % flush 10 mL  Every 12 Hours Scheduled,   Status:  Discontinued         11/15/24 0958    11/15/24 0957  sodium chloride 0.9 % flush 1-10 mL  As Needed,   Status:  Discontinued         11/15/24 0958    11/15/24 0957  sodium chloride 0.9 % infusion 40 mL  As Needed,   Status:  Discontinued         11/15/24 0958    11/15/24 0900  aspirin chewable tablet 81 mg  Daily,   Status:  Discontinued         11/14/24 2225    11/15/24 0900  FLUoxetine (PROzac) capsule 10 mg  Daily,   Status:  Discontinued         11/14/24 2226    11/15/24 0900  NIFEdipine XL (PROCARDIA XL) 24 hr tablet 60 mg  Daily,   Status:  Discontinued         11/14/24 2226    11/15/24 0900  prenatal vitamin 27-0.8 tablet 1 tablet  Daily,   Status:  Discontinued         11/14/24 2226 11/14/24 2315  busPIRone (BUSPAR) tablet 15 mg  3 Times Daily,   Status:  Discontinued         11/14/24 2226 11/14/24 2315  docusate sodium (COLACE) capsule 100 mg  2 Times Daily,   Status:  Discontinued         11/14/24 2226 11/14/24 2315  famotidine (PEPCID) tablet 20 mg  2 Times Daily Before Meals,   Status:  Discontinued         11/14/24 2226 11/14/24 0000  Chlamydia trachomatis, Neisseria gonorrhoeae, PCR w/ confirmation - Swab, Vagina        Comments: This is an external result entered through the  Results Console.      11/14/24 1807 11/14/24 0000  Gonorrhea Screen - Swab,        Comments: This is an external result entered through the Results Console.      11/14/24 1807 11/14/24 0000  Hepatitis B Surface Antigen        Comments: This is an external result entered through the Results Console.      11/14/24 1807 11/14/24 0000  Rubella Antibody, IgG        Comments: This is an external result entered through the Results Console.      11/14/24 1807 11/14/24 0000  HIV-1 Antibody, EIA        Comments: This is an external result entered through the Results Console.      11/14/24 1807    Unscheduled  Blood Gas, Arterial -With Co-Ox Panel: Yes  As Needed       11/15/24 1815    Unscheduled  Up with Assistance  As Needed       11/15/24 2029    Unscheduled  Bladder Scan if Patient Unable to Void 4-6 Hours After Catheter Removal  As Needed         11/15/24 2029    Unscheduled  Straight Cath Every 4-6 Hours As Needed If Patient is Unable to Void After 4-6 Hours, Bladder Scan Volume is Greater Than 350-500mL & Patient Has Symptoms of Bladder Discomfort / Distention  As Needed       11/15/24 2029    Unscheduled  Schedule / Prompt Voiding For Patients With Urinary Incontinence  As Needed       11/15/24 2029    Unscheduled  Wound Care  As Needed      Comments: Postop day 1. Remove dressing and leave incision open to air.    11/15/24 2029    Unscheduled  Chewing Gum  As Needed       11/15/24 2029    Unscheduled  Warm compress  As Needed       11/15/24 2029    Unscheduled  Apply ace wrap, tight bra, or binder  As Needed       11/15/24 2029    Unscheduled  Apply ice packs  As Needed       11/15/24 2029    --  Famotidine (PEPCID AC PO)  2 Times Daily         11/14/24 1705    --  NIFEdipine XL (PROCARDIA XL) 60 MG 24 hr tablet  Daily         11/14/24 2219    Pending  Discharge patient  Once         Pending    Pending  nystatin (MYCOSTATIN) [id number] UNIT/GM cream  2 Times Daily         Pending                      Operative/Procedure Notes (last 24 hours)        Osiris Bobo DO at 11/15/24 1623          Repeat Low Transverse  Section, Extensive Lysis of Adhesions, Partial Omentectomy  Evi Hare  11/15/2024  36w1d    Pre-op Diagnosis:   EFRAIN  Previous  Section    Post-op Diagnosis:     BELA  Severe Adhesive Disease  Uterine Fibroid    Procedure(s):   SECTION REPEAT  PARTIAL OMENTECTOMY  LYSIS OF ADHESIONS     Surgeon(s):  Osiris Bobo DO    Anesthesia: Spinal    Staff:   Circulator: María Ball RN  Scrub Person: Yareli Flores  Baby Nurse: Jerica Gregory RN  Assistant: Joann Connell    Estimated Blood Loss:  850 CC    Urine Output:: 50 mL    IVF: Intravenous fluids were administered, lactated Ringer's    I/O this shift:  In: 1000 [I.V.:1000]  Out: 850 [Urine:50; Blood:800]    Grafts/implants: None    Procedure   The patient was placed in the dorsal supine position.  She was then prepped and draped in the normal sterile fashion.  Spinal anesthesia was found to be adequate.  A pfannenstiel skin incision was made with the scalpel and carried down to the underlying layer of fascia.  The fascia was then incised and extended bilaterally with the ireland scissors.  The superior aspect of this incision was grasped with kocher clamps x 2, tented upward and dissected off with ireland scissors.  The clamps were removed and placed on the inferior aspect of the incision, tented upward and dissected off the rectus muscle to the pubic simphysis.  The rectus muscle was then  in the midline.   The omentum was already noted to be adhered to the peritoneum and the rectus muscle.  I was able to find an area of peritoneum superiorly this was grasped with hemostat x 2 and entered sharply Metzenbaum scissors and then bluntly extended.  The omentum was adhered densely to the entire uterus all the way down to the lower uterine segment.  I had to use the surgeon's finger to undermine the omentum  and to Bovie the omentum away from the lower uterine segment to provide a window.  A transverse uterine incision was then made with the scalpel and extended in a cranio-caudal  Blunt fashion.  Bladder blade was removed. The infants head was then grasped and brought through the hysterotomy.  The mouth and nares was bulb suctioned.  The shoulders and body were then delivered atraumatically.  The cord was then doubly clamped and cut and the infant was handed off to the nursing staff with vigorous cry and movement of all extremities.  Cord blood was obtained.  The placenta was manually extracted.  The uterus was able to be exteriorized and cleared of all clot and debris.  The hysterotomy was grasped with Allis clamps at the edge and repaired with 1-0 Monocryl in a running and locked single layer fashion.  At this point there was still remaining omentum adhered to the anterior uterine wall and all along the right peritoneal surface.  I was able to free this omentum and up from the uterus and the peritoneum.  I then ran the omentum and noted a large defect in the middle of the omentum of approximately 7 cm in size.  I elected to use a Enseal impact to perform a partial omentectomy to prevent bowel obstruction in the future.  The posterior cul-de-sac was irrigated and suctioned.  The uterus was hemostatic and returned to the abdomen.  A 2 cm anterior uterine fibroid was noted subserosally on the uterus.  The gutters were cleared of all clot and debris.  Second look at the uterine incision was hemostatic.  The rectus muscle was visualized and hemostatic.  They were then reapproximated with a 0 Vicryl suture in a running fashion.  There was a small amount of bleeding from a small perforated vessel at the rectus muscle just under the superior fascial incision.  This was ligated with a 3-0 chromic stitch and Emiliano was placed over it for improved hemostasis.  The fascia was then grasped with kocher clamps and re-approximated  with 0 vicryl in a single layer and running fashion.    The subcutaneous layer was irrigated and hemostatic with Bovie cautery.  This layer was then re-approximated with 2-0 plain gut stitch.  The skin was re-approximated with 3-0 vicryl on a robyn needle.  Sterile dressing was applied to the incision.  Uterus was found to be firm and at the umbilicus.  Sponge, lap and needle counts were correct.  Pt taken to recovery in stable condition.     APGARS  7   9            GENDER  MALE  Complications: None      Osiris Bobo DO     Date: 11/15/2024  Time: 18:03 EST      Electronically signed by Osiris Bobo DO at 11/15/24 1822       Physician Progress Notes (last 24 hours)  Notes from 11/15/24 1101 through 11/16/24 1101   No notes of this type exist for this encounter.

## 2024-11-16 NOTE — DISCHARGE INSTR - APPOINTMENTS
Follow up with Nilda Reed at [facility] on Tuesday Dec. 3rd at 11:30.

## 2024-11-16 NOTE — ANESTHESIA POSTPROCEDURE EVALUATION
Patient: Evi Hare    Procedure Summary       Date: 11/15/24 Room / Location:  [facility]   [facility]    Anesthesia Start: 1553 Anesthesia Stop:     Procedures:        SECTION REPEAT (Bilateral: Abdomen)      OMENTECTOMY (Abdomen)      LAPAROTOMY WITH LYSIS OF ADHESIONS (Abdomen) Diagnosis:     Surgeons: Osiris Bobo DO Provider: Thai Rosales MD    Anesthesia Type: spinal, ITN ASA Status: 3            Anesthesia Type: spinal, ITN    Vitals  Vitals Value Taken Time   /91 11/15/24 1924   Temp 97.7 °F (36.5 °C) 11/15/24 1735   Pulse 99 11/15/24 1931   Resp 20 11/15/24 1900   SpO2 96 % 11/15/24 1931   Vitals shown include unfiled device data.        Post Anesthesia Care and Evaluation    Patient location during evaluation: PACU  Patient participation: complete - patient participated  Level of consciousness: awake  Pain score: 0  Pain management: satisfactory to patient    Airway patency: patent  Anesthetic complications: No anesthetic complications  PONV Status: none  Cardiovascular status: hemodynamically stable  Respiratory status: nasal cannula  Hydration status: acceptable  Post Neuraxial Block status: No signs or symptoms of PDPH

## 2024-11-17 LAB — BACTERIA SPEC AEROBE CULT: NO GROWTH

## 2024-11-17 PROCEDURE — 63710000001 ONDANSETRON ODT 4 MG TABLET DISPERSIBLE: Performed by: OBSTETRICS & GYNECOLOGY

## 2024-11-17 RX ORDER — ACETAMINOPHEN 325 MG/1
650 TABLET ORAL EVERY 6 HOURS PRN
Status: DISCONTINUED | OUTPATIENT
Start: 2024-11-17 | End: 2024-11-19 | Stop reason: HOSPADM

## 2024-11-17 RX ORDER — IBUPROFEN 600 MG/1
600 TABLET, FILM COATED ORAL EVERY 6 HOURS PRN
Status: DISCONTINUED | OUTPATIENT
Start: 2024-11-17 | End: 2024-11-19 | Stop reason: HOSPADM

## 2024-11-17 RX ADMIN — OXYCODONE HYDROCHLORIDE 10 MG: 10 TABLET ORAL at 15:20

## 2024-11-17 RX ADMIN — BUSPIRONE HYDROCHLORIDE 15 MG: 10 TABLET ORAL at 06:22

## 2024-11-17 RX ADMIN — FLUOXETINE HYDROCHLORIDE 10 MG: 10 CAPSULE ORAL at 08:12

## 2024-11-17 RX ADMIN — POLYETHYLENE GLYCOL (3350) 17 G: 17 POWDER, FOR SOLUTION ORAL at 08:11

## 2024-11-17 RX ADMIN — IBUPROFEN 600 MG: 600 TABLET, FILM COATED ORAL at 15:20

## 2024-11-17 RX ADMIN — ACETAMINOPHEN 650 MG: 325 TABLET ORAL at 13:17

## 2024-11-17 RX ADMIN — OXYCODONE HYDROCHLORIDE 10 MG: 10 TABLET ORAL at 21:49

## 2024-11-17 RX ADMIN — BUSPIRONE HYDROCHLORIDE 15 MG: 10 TABLET ORAL at 21:45

## 2024-11-17 RX ADMIN — ACETAMINOPHEN 650 MG: 325 TABLET ORAL at 21:49

## 2024-11-17 RX ADMIN — IBUPROFEN 600 MG: 600 TABLET, FILM COATED ORAL at 08:14

## 2024-11-17 RX ADMIN — ONDANSETRON 4 MG: 4 TABLET, ORALLY DISINTEGRATING ORAL at 00:21

## 2024-11-17 RX ADMIN — NIFEDIPINE 60 MG: 30 TABLET, FILM COATED, EXTENDED RELEASE ORAL at 08:12

## 2024-11-17 RX ADMIN — OXYCODONE HYDROCHLORIDE 10 MG: 10 TABLET ORAL at 03:23

## 2024-11-17 RX ADMIN — BUSPIRONE HYDROCHLORIDE 15 MG: 10 TABLET ORAL at 15:20

## 2024-11-17 RX ADMIN — OXYCODONE HYDROCHLORIDE 10 MG: 10 TABLET ORAL at 08:12

## 2024-11-17 NOTE — PROGRESS NOTES
" Ethel   PROGRESS NOTE    Post-Op Day 2 S/P   Subjective   Subjective  Patient reports:  Pain is controlled with prescription NSAID's including ibuprofen (Motrin) and narcotic analgesics including hydrocodone/acetaminophen (Lorcet, Lortab, Norco, Vicodin).  She is up out of bed this am. Tolerating diet. Tolerating po -- normal. Voiding - without difficulty; flatus reported..  Vaginal bleeding is as much as expected.    Objective    Objective:  Vital signs (most recent): Blood pressure 140/81, pulse 99, temperature 98.6 °F (37 °C), temperature source Oral, resp. rate 16, height 165.1 cm (65\"), weight 108 kg (238 lb 6.4 oz), last menstrual period 2024, SpO2 97%, currently breastfeeding.       Vitals: Vital Signs Range for the last 24 hours  Temperature: Temp:  [98.5 °F (36.9 °C)-98.6 °F (37 °C)] 98.6 °F (37 °C)   Temp Source: Temp src: Oral   BP: BP: (119-140)/(72-81) 140/81   Pulse: Heart Rate:  [] 99   Respirations: Resp:  [16-18] 16   Weight:              Physical Exam    Lungs clear to auscultation bilaterally   Abdomen Soft, ND, tender along incision. + BS   Incision  Clean, dry, intact   Extremities extremities normal, atraumatic, no cyanosis or edema         [unfilled]       Lab Results   Component Value Date    ABO O 11/15/2024    RH Positive 11/15/2024        Lab Results   Component Value Date    HGB 11.1 (L) 2024    HCT 33.5 (L) 2024       Assessment & Plan        Decreased fetal movement    Chronic hypertension with superimposed preeclampsia    Assessment & Plan    Assessment:    Evi Hare is Day 2  post-partum  , Low Transverse  .    CHTN - stable on procardia.    Depression /Anxiety- prozac and buspar restarted at pt request.  Pt stable.   Plan:  continue postop care.      Osiris Bobo DO  24  09:46 EST    "

## 2024-11-17 NOTE — PLAN OF CARE
Problem: Adult Inpatient Plan of Care  Goal: Plan of Care Review  Outcome: Progressing  Flowsheets (Taken 11/17/2024 0251)  Progress: improving  Outcome Evaluation: vitals and bleeding wnl, fundus firm, incision clean, dry and intact  Plan of Care Reviewed With: patient   Goal Outcome Evaluation:  Plan of Care Reviewed With: patient        Progress: improving  Outcome Evaluation: vitals and bleeding wnl, fundus firm, incision clean, dry and intact

## 2024-11-18 RX ADMIN — NIFEDIPINE 60 MG: 30 TABLET, FILM COATED, EXTENDED RELEASE ORAL at 08:15

## 2024-11-18 RX ADMIN — BUSPIRONE HYDROCHLORIDE 15 MG: 10 TABLET ORAL at 06:09

## 2024-11-18 RX ADMIN — FLUOXETINE HYDROCHLORIDE 10 MG: 10 CAPSULE ORAL at 08:15

## 2024-11-18 RX ADMIN — OXYCODONE HYDROCHLORIDE 10 MG: 10 TABLET ORAL at 17:38

## 2024-11-18 RX ADMIN — POLYETHYLENE GLYCOL (3350) 17 G: 17 POWDER, FOR SOLUTION ORAL at 08:15

## 2024-11-18 RX ADMIN — IBUPROFEN 600 MG: 600 TABLET, FILM COATED ORAL at 17:38

## 2024-11-18 RX ADMIN — BUSPIRONE HYDROCHLORIDE 15 MG: 10 TABLET ORAL at 21:43

## 2024-11-18 RX ADMIN — IBUPROFEN 600 MG: 600 TABLET, FILM COATED ORAL at 08:15

## 2024-11-18 RX ADMIN — BUSPIRONE HYDROCHLORIDE 15 MG: 10 TABLET ORAL at 17:39

## 2024-11-18 RX ADMIN — ACETAMINOPHEN 650 MG: 325 TABLET ORAL at 12:00

## 2024-11-18 RX ADMIN — IBUPROFEN 600 MG: 600 TABLET, FILM COATED ORAL at 01:47

## 2024-11-18 NOTE — PROGRESS NOTES
" Kwabena   PROGRESS NOTE    Post-Op Day 3 S/P   Subjective   Subjective  Patient reports:  Pain is controlled with narcotic analgesics including Percocet.  She is up out of bed this am. Tolerating diet. Tolerating po -- normal. Voiding - without difficulty; flatus reported..  Vaginal bleeding is light.    Objective    Objective:  Vital signs (most recent): Blood pressure 126/76, pulse 88, temperature 98 °F (36.7 °C), temperature source Oral, resp. rate 18, height 165.1 cm (65\"), weight 108 kg (238 lb 6.4 oz), last menstrual period 2024, SpO2 95%, currently breastfeeding.       Vitals: Vital Signs Range for the last 24 hours  Temperature: Temp:  [97.8 °F (36.6 °C)-98.6 °F (37 °C)] 98 °F (36.7 °C)   Temp Source: Temp src: Oral   BP: BP: (126-140)/(76-83) 126/76   Pulse: Heart Rate:  [88-99] 88   Respirations: Resp:  [16-20] 18   Weight:              Physical Exam    Lungs clear to auscultation bilaterally   Abdomen Soft, ND, tender along incision. + BS   Incision  Clean, dry, intact   Extremities extremities normal, atraumatic, no cyanosis or edema         [unfilled]       Lab Results   Component Value Date    ABO O 11/15/2024    RH Positive 11/15/2024        Lab Results   Component Value Date    HGB 11.1 (L) 2024    HCT 33.5 (L) 2024       Assessment & Plan        Decreased fetal movement    Chronic hypertension with superimposed preeclampsia    Assessment & Plan    Assessment:    Evi Hare is Day 3  post-partum  , Low Transverse  .      Plan:  continue post op care.      Sindhu Belcher, DO  24  08:11 EST    "

## 2024-11-18 NOTE — PLAN OF CARE
Problem: Adult Inpatient Plan of Care  Goal: Plan of Care Review  Outcome: Adequate for Care Transition  Goal: Patient-Specific Goal (Individualized)  Outcome: Adequate for Care Transition  Goal: Absence of Hospital-Acquired Illness or Injury  Outcome: Adequate for Care Transition  Intervention: Identify and Manage Fall Risk  Recent Flowsheet Documentation  Taken 2024 by Bella Parker RN  Safety Promotion/Fall Prevention: safety round/check completed  Intervention: Prevent Skin Injury  Recent Flowsheet Documentation  Taken 2024 by Bella Parker RN  Body Position: position changed independently  Intervention: Prevent Infection  Recent Flowsheet Documentation  Taken 2024 by Bella Parker RN  Infection Prevention: single patient room provided  Goal: Optimal Comfort and Wellbeing  Outcome: Adequate for Care Transition  Intervention: Monitor Pain and Promote Comfort  Recent Flowsheet Documentation  Taken 2024 by Bella Parker RN  Pain Management Interventions:   around-the-clock dosing utilized   pain management plan reviewed with patient/caregiver  Intervention: Provide Person-Centered Care  Recent Flowsheet Documentation  Taken 2024 by Bella Parker RN  Trust Relationship/Rapport:   choices provided   care explained   questions answered   questions encouraged   reassurance provided  Goal: Readiness for Transition of Care  Outcome: Adequate for Care Transition     Problem: Hypertensive Disorders in Pregnancy  Goal: Patient-Fetal Stabilization  Outcome: Adequate for Care Transition  Intervention: Monitor and Manage Symptom Progression  Recent Flowsheet Documentation  Taken 2024 by Bella Parker RN  Medication Review/Management: medications reviewed     Problem: Postpartum ( Delivery)  Goal: Successful Parent Role Transition  Outcome: Adequate for Care Transition  Goal: Hemostasis  Outcome: Adequate for Care Transition  Goal: Effective Bowel  Elimination  Outcome: Adequate for Care Transition  Goal: Fluid and Electrolyte Balance  Outcome: Adequate for Care Transition  Goal: Absence of Infection Signs and Symptoms  Outcome: Adequate for Care Transition  Goal: Anesthesia/Sedation Recovery  Outcome: Adequate for Care Transition  Intervention: Optimize Anesthesia Recovery  Recent Flowsheet Documentation  Taken 11/18/2024 0815 by Bella Parker RN  Safety Promotion/Fall Prevention: safety round/check completed  Administration (IS): self-administered  Goal: Optimal Pain Control and Function  Outcome: Adequate for Care Transition  Intervention: Prevent or Manage Pain  Recent Flowsheet Documentation  Taken 11/18/2024 0815 by Bella Parker RN  Perineal Care: perineal spray bottle/warm water use encouraged  Pain Management Interventions:   around-the-clock dosing utilized   pain management plan reviewed with patient/caregiver  Goal: Nausea and Vomiting Relief  Outcome: Adequate for Care Transition  Goal: Effective Urinary Elimination  Outcome: Adequate for Care Transition  Goal: Effective Oxygenation and Ventilation  Outcome: Adequate for Care Transition     Problem: Pain Acute  Goal: Optimal Pain Control and Function  Outcome: Adequate for Care Transition  Intervention: Optimize Psychosocial Wellbeing  Recent Flowsheet Documentation  Taken 11/18/2024 0815 by Bella Parker RN  Diversional Activities:   television   smartphone  Intervention: Develop Pain Management Plan  Recent Flowsheet Documentation  Taken 11/18/2024 0815 by Bella Parker RN  Pain Management Interventions:   around-the-clock dosing utilized   pain management plan reviewed with patient/caregiver  Intervention: Prevent or Manage Pain  Recent Flowsheet Documentation  Taken 11/18/2024 0815 by Bella Parker RN  Medication Review/Management: medications reviewed     Problem: Fall Injury Risk  Goal: Absence of Fall and Fall-Related Injury  Outcome: Adequate for Care Transition  Intervention:  Identify and Manage Contributors  Recent Flowsheet Documentation  Taken 11/18/2024 0815 by Bella Parker, RN  Medication Review/Management: medications reviewed  Intervention: Promote Injury-Free Environment  Recent Flowsheet Documentation  Taken 11/18/2024 0815 by Bella Parker RN  Safety Promotion/Fall Prevention: safety round/check completed     Problem: Surgical Site Infection  Goal: Absence of Infection Signs and Symptoms  Outcome: Adequate for Care Transition   Goal Outcome Evaluation:

## 2024-11-18 NOTE — PLAN OF CARE
Problem: Adult Inpatient Plan of Care  Goal: Plan of Care Review  Outcome: Progressing  Flowsheets  Taken 11/18/2024 0327  Progress: improving  Plan of Care Reviewed With:   patient   spouse  Taken 11/17/2024 0251  Outcome Evaluation: vitals and bleeding wnl, fundus firm, incision clean, dry and intact  Goal: Patient-Specific Goal (Individualized)  Outcome: Progressing  Goal: Absence of Hospital-Acquired Illness or Injury  Outcome: Progressing  Intervention: Identify and Manage Fall Risk  Description: Perform standard risk assessment on admission using a validated tool or comprehensive approach appropriate to the patient; reassess fall risk frequently, with change in status or transfer to another level of care.  Communicate risk to interprofessional healthcare team; ensure fall risk visible cue.  Determine need for increased observation, equipment and environmental modification, as well as use of supportive, nonskid footwear.  Adjust safety measures to individual needs and identified risk factors.  Reinforce the importance of active participation with fall risk prevention, safety, and physical activity with the patient and family.  Perform regular intentional rounding to assess need for position change, pain assessment and personal needs, including assistance with toileting.  Flowsheets  Taken 11/18/2024 0100 by Joselyn Naidu  Safety Promotion/Fall Prevention: safety round/check completed  Taken 11/17/2024 2000 by Lindy Flores RN  Safety Promotion/Fall Prevention: safety round/check completed  Intervention: Prevent Skin Injury  Description: Perform a screening for skin injury risk, such as pressure or moisture-associated skin damage on admission and at regular intervals throughout hospital stay.  Keep all areas of skin (especially folds) clean and dry.  Maintain adequate skin hydration.  Relieve and redistribute pressure and protect bony prominences and skin at risk for injury; implement measures based  on patient-specific risk factors.  Match turning and repositioning schedule to clinical condition.  Encourage weight shift frequently; assist with reposition if unable to complete independently.  Float heels off bed; avoid pressure on the Achilles tendon.  Keep skin free from extended contact with medical devices.  Optimize nutrition and hydration.  Encourage functional activity and mobility, as early as tolerated.  Use aids (e.g., slide boards, mechanical lift) during transfer.  Flowsheets (Taken 11/18/2024 0100 by Joselyn Naidu)  Body Position: position changed independently  Goal: Optimal Comfort and Wellbeing  Outcome: Progressing  Intervention: Monitor Pain and Promote Comfort  Description: Assess pain level, treatment efficacy and patient response at regular intervals using a consistent pain scale.  Consider the presence and impact of preexisting chronic pain.  Encourage patient and caregiver involvement in pain assessment, interventions and safety measures.  Promote activity; balance with sleep and rest to enhance healing.  Recent Flowsheet Documentation  Taken 11/18/2024 0147 by Lindy Flores RN  Pain Management Interventions: pain medication given  Taken 11/17/2024 2149 by Lindy Flores RN  Pain Management Interventions: pain medication given  Intervention: Provide Person-Centered Care  Description: Use a family-focused approach to care; encourage support system presence and participation.  Develop trust and rapport by proactively providing information, encouraging questions, addressing concerns and offering reassurance.  Acknowledge emotional response to hospitalization.  Recognize and utilize personal coping strategies and strengths; develop goals via shared decision-making.  Honor spiritual and cultural preferences.  Recent Flowsheet Documentation  Taken 11/17/2024 2000 by Lindy Flores RN  Trust Relationship/Rapport:   care explained   choices provided  Goal: Readiness for  Transition of Care  Outcome: Progressing     Problem: Hypertensive Disorders in Pregnancy  Goal: Patient-Fetal Stabilization  Outcome: Progressing  Intervention: Monitor and Manage Symptom Progression  Description: Regularly evaluate respiratory status; report presence of abnormal findings.  Assess for behavioral changes, restlessness, epigastric or abdominal pain, visual disturbances or headache not relieved by medication.  Implement seizure precautions.  Anticipate initiation of magnesium sulfate infusion for seizure prevention; continue for up to 24 hours postpartum. Monitor for signs of magnesium toxicity; have calcium gluconate readily available.  Monitor for coagulopathy and signs of abnormal bleeding, such as oozing from intravenous sites or petechiae.  Prepare for delivery based on change in patient-fetal status. Anticipate administration of  corticosteroids and magnesium sulfate for  birth if indicated.  Consult anesthesia to determine labor analgesia or anesthesia for .  Promote anxiety-reducing practices, such as quiet, calm and normothermic environment; relaxation techniques.  Provide calm, reassuring presence; offer clear explanation of events.  Recent Flowsheet Documentation  Taken 2024 by Lindy Flores RN  Medication Review/Management: medications reviewed     Problem: Postpartum ( Delivery)  Goal: Successful Parent Role Transition  Outcome: Progressing  Goal: Hemostasis  Outcome: Progressing  Goal: Effective Bowel Elimination  Outcome: Progressing  Goal: Fluid and Electrolyte Balance  Outcome: Progressing  Goal: Absence of Infection Signs and Symptoms  Outcome: Progressing  Goal: Anesthesia/Sedation Recovery  Outcome: Progressing  Intervention: Optimize Anesthesia Recovery  Description: Assess and monitor airway, breathing and circulation; maintain close surveillance for deterioration.  Implement continuous monitoring, such as cardiorespiratory, blood  pressure, temperature, pulse oximetry and capnography.  Elevate head of bed, if able; facilitate regular position changes.  Assess neurocognitive function and for risks that may lead to postoperative delirium, such as decreased level of consciousness, pain and agitation; offer reassurance; answer questions.  Assess and monitor neurovascular and neuromuscular function, such as motor strength, tone, posture, peripheral pulses and extremity sensation; protect areas of decreased sensation from heat, cold, medical devices or objects.  Individualize frequency and intensity of monitoring based on sedation or anesthesia administered, identified risk factors, ongoing assessment and organizational protocol.  Prepare for administration of pharmacologic therapy, such as reversal agent, antiemetic or antipruritic medication, to manage sedation or anesthesia effects.  Adjust environment to maintain safety (e.g., fall precautions, safety equipment).  Recent Flowsheet Documentation  Taken 11/17/2024 2000 by Lindy Flores RN  Safety Promotion/Fall Prevention: safety round/check completed  Administration (IS): self-administered  Goal: Optimal Pain Control and Function  Outcome: Progressing  Intervention: Prevent or Manage Pain  Description: Set pain management goals; mutually determine pain management plan and review plan regularly.  Use a consistent, validated tool for pain assessment including function and quality of life; evaluate pain level, effect of treatment and patient's response at regular intervals.  Match pharmacologic analgesia to severity and type of pain mechanism; evaluate risk for opioid use and dependence; consider multimodal approach and titrate to patient response.  Provide around-the-clock dosing of pain medication to keep pain levels in control.  Manage medication-induced effects, such as constipation, nausea, pruritus, urinary retention, somnolence and dizziness.  Provide multimodal interventions, such as  physical activity, therapeutic exercise, yoga, TENS (transcutaneous electrical nerve stimulation) and manual therapy; consider addition of complementary or alternative therapy.  Use cold application, as culturally-appropriate, to the incisional area for the first 24 to 48 hours to enhance comfort.  Encourage early ambulation, when able, to help avoid gas accumulation which can add to discomfort.  Verify correct infant latch when breastfeeding to prevent nipple pain.  If engorgement occurs, encourage more frequent breastfeeding or pumping and storing additional milk to ease discomfort. Cold compresses, as culturally-appropriate, may be used if bottle-feeding.  If postdural puncture headache identified, encourage adequate hydration and anticipate the need for epidural blood patch.  If hemorrhoids are present and painful, offer topical pain relief and sitz baths for comfort.  Consider and address emotional response to pain.  Modify pain perception by using techniques, such as distraction, mindfulness, guided imagery, meditation or music.  Recent Flowsheet Documentation  Taken 11/18/2024 0147 by Lindy Flores RN  Pain Management Interventions: pain medication given  Taken 11/17/2024 2149 by Lindy Flores RN  Pain Management Interventions: pain medication given  Goal: Nausea and Vomiting Relief  Outcome: Progressing  Goal: Effective Urinary Elimination  Outcome: Progressing  Goal: Effective Oxygenation and Ventilation  Outcome: Progressing     Problem: Pain Acute  Goal: Optimal Pain Control and Function  Outcome: Progressing  Intervention: Develop Pain Management Plan  Description: Acknowledge patient as the expert in pain self-management.  Use a consistent, validated tool for pain assessment; include function and quality of life.  Evaluate risk for opioid use and dependence.  Set pain management goals; determine acceptable level of discomfort to allow for maximal functioning.  Determine mutually  agreed-upon pain management plan, including both pharmacologic and nonpharmacologic measures; integrate management of chronic (persistent) pain.  Identify and integrate past successful treatment measures, if able.  Reevaluate plan regularly.  Recent Flowsheet Documentation  Taken 11/18/2024 0147 by Lindy Flores RN  Pain Management Interventions: pain medication given  Taken 11/17/2024 2149 by Lindy Flores RN  Pain Management Interventions: pain medication given  Intervention: Prevent or Manage Pain  Description: Evaluate pain level, effect of treatment and patient response at regular intervals.  Minimize painful stimuli; coordinate care and adjust environment (e.g., light, noise, unnecessary movement); promote sleep/rest.  Match pharmacologic analgesia to severity and type of pain mechanism (e.g., neuropathic, muscle, inflammatory); consider multimodal approach.  Provide medication at regular intervals; titrate to patient response; premedicate for painful procedures.  Manage breakthrough pain with additional doses; consider rotation or switching medication.  Monitor for signs of substance tolerance (increased dose to reach desired effect, decreased effect with same dose).  Manage medication-induced adverse events and side effects.  Provide multimodal interventions, such as physical activity, therapeutic exercise, yoga, TENS (transcutaneous electrical nerve stimulation) and manual therapy.  Train in functional activity modifications, such as body mechanics, posture, ergonomics, energy conservation and activity pacing.  Consider addition of complementary or alternative therapy, such as acupuncture, hypnosis or therapeutic touch.  Recent Flowsheet Documentation  Taken 11/17/2024 2000 by Lindy Flores RN  Medication Review/Management: medications reviewed     Problem: Fall Injury Risk  Goal: Absence of Fall and Fall-Related Injury  Outcome: Progressing  Intervention: Identify and Manage  Contributors  Description: Develop a fall prevention plan, considering patient-centered interventions and family/caregiver involvement; identify and address patient's facilitators and barriers.  Provide reorientation, appropriate sensory stimulation and routines with changes in mental status to decrease risk of fall.  Promote use of personal vision and auditory aids.  Assess assistance level required for safe and effective self-care; provide support as needed, such as toileting and mobilization. For age 65 and older, implement timed toileting with assistance.  Encourage physical activity, such as performance of mobility and self-care at highest level of patient ability, multicomponent exercise program and provision of appropriate assistive devices.  If fall occurs, assess the severity of injury; implement fall injury protocol. Determine the cause and revise fall injury prevention plan.  Regularly review and advocate for medication adjustment to decrease fall risk; consider administration times, polypharmacy and age.  Balance adequate pain management with potential for oversedation.  Recent Flowsheet Documentation  Taken 11/17/2024 2000 by Lindy Flores RN  Medication Review/Management: medications reviewed  Intervention: Promote Injury-Free Environment  Description: Provide a safe, barrier-free environment that encourages independent activity.  Keep care area uncluttered and well-lighted.  Determine need for increased observation or monitoring.  Avoid use of devices that minimize mobility, such as restraints or indwelling urinary catheter.  Recent Flowsheet Documentation  Taken 11/17/2024 2000 by Lindy Flores RN  Safety Promotion/Fall Prevention: safety round/check completed     Problem: Surgical Site Infection  Goal: Absence of Infection Signs and Symptoms  Outcome: Progressing   Goal Outcome Evaluation:  Plan of Care Reviewed With: patient, spouse        Progress: improving  Outcome Evaluation:  vitals and bleeding wnl, fundus firm, incision clean, dry and intact

## 2024-11-19 VITALS
HEIGHT: 65 IN | TEMPERATURE: 98.1 F | DIASTOLIC BLOOD PRESSURE: 89 MMHG | OXYGEN SATURATION: 95 % | HEART RATE: 91 BPM | BODY MASS INDEX: 39.72 KG/M2 | SYSTOLIC BLOOD PRESSURE: 137 MMHG | WEIGHT: 238.4 LBS | RESPIRATION RATE: 18 BRPM

## 2024-11-19 PROCEDURE — 86695 HERPES SIMPLEX TYPE 1 TEST: CPT | Performed by: OBSTETRICS & GYNECOLOGY

## 2024-11-19 PROCEDURE — 86696 HERPES SIMPLEX TYPE 2 TEST: CPT | Performed by: OBSTETRICS & GYNECOLOGY

## 2024-11-19 RX ORDER — OXYCODONE HYDROCHLORIDE 5 MG/1
5 TABLET ORAL EVERY 4 HOURS PRN
Qty: 15 TABLET | Refills: 0 | Status: SHIPPED | OUTPATIENT
Start: 2024-11-19

## 2024-11-19 RX ORDER — NYSTATIN 100000 U/G
1 CREAM TOPICAL 2 TIMES DAILY
Qty: 30 G | Refills: 0 | Status: SHIPPED | OUTPATIENT
Start: 2024-11-19

## 2024-11-19 RX ORDER — IBUPROFEN 600 MG/1
600 TABLET, FILM COATED ORAL EVERY 6 HOURS PRN
Qty: 40 TABLET | Refills: 0 | Status: SHIPPED | OUTPATIENT
Start: 2024-11-19

## 2024-11-19 RX ORDER — FLUCONAZOLE 150 MG/1
150 TABLET ORAL
Qty: 3 TABLET | Refills: 0 | Status: SHIPPED | OUTPATIENT
Start: 2024-11-19

## 2024-11-19 RX ORDER — POLYETHYLENE GLYCOL 3350 17 G/17G
17 POWDER, FOR SOLUTION ORAL DAILY PRN
Qty: 14 PACKET | Refills: 0 | Status: SHIPPED | OUTPATIENT
Start: 2024-11-19

## 2024-11-19 RX ADMIN — IBUPROFEN 600 MG: 600 TABLET, FILM COATED ORAL at 08:34

## 2024-11-19 RX ADMIN — FLUOXETINE HYDROCHLORIDE 10 MG: 10 CAPSULE ORAL at 08:34

## 2024-11-19 RX ADMIN — BUSPIRONE HYDROCHLORIDE 15 MG: 10 TABLET ORAL at 05:45

## 2024-11-19 RX ADMIN — NIFEDIPINE 60 MG: 30 TABLET, FILM COATED, EXTENDED RELEASE ORAL at 08:34

## 2024-11-19 RX ADMIN — POLYETHYLENE GLYCOL (3350) 17 G: 17 POWDER, FOR SOLUTION ORAL at 08:34

## 2024-11-19 RX ADMIN — ACETAMINOPHEN 650 MG: 325 TABLET ORAL at 05:45

## 2024-11-19 NOTE — PLAN OF CARE
Problem: Adult Inpatient Plan of Care  Goal: Absence of Hospital-Acquired Illness or Injury  Intervention: Identify and Manage Fall Risk  Recent Flowsheet Documentation  Taken 2024 by Bella Parker RN  Safety Promotion/Fall Prevention: safety round/check completed  Intervention: Prevent Skin Injury  Recent Flowsheet Documentation  Taken 2024 by Bella Parker RN  Body Position: position changed independently  Intervention: Prevent Infection  Recent Flowsheet Documentation  Taken 2024 by Bella Parker RN  Infection Prevention: rest/sleep promoted  Goal: Optimal Comfort and Wellbeing  Intervention: Monitor Pain and Promote Comfort  Recent Flowsheet Documentation  Taken 2024 by Bella Parker RN  Pain Management Interventions:   around-the-clock dosing utilized   pain management plan reviewed with patient/caregiver  Intervention: Provide Person-Centered Care  Recent Flowsheet Documentation  Taken 2024 by Bella Parker RN  Trust Relationship/Rapport:   care explained   choices provided   questions answered   questions encouraged     Problem: Hospitalized  Patient  Goal: Optimal Patient-Fetal Wellbeing  Intervention: Support Psychosocial Response  Recent Flowsheet Documentation  Taken 2024 by Bella Parker RN  Family/Support System Care: self-care encouraged     Problem: Hypertensive Disorders in Pregnancy  Goal: Patient-Fetal Stabilization  Intervention: Monitor and Manage Symptom Progression  Recent Flowsheet Documentation  Taken 2024 by Bella Parker RN  Medication Review/Management: medications reviewed     Problem:  Delivery  Goal: Stable Fetal Wellbeing  Intervention: Promote and Monitor Fetal Wellbeing  Recent Flowsheet Documentation  Taken 2024 by Bella Parker RN  Body Position: position changed independently  Goal: Absence of Infection Signs and Symptoms  Intervention: Prevent or Manage  Infection  Recent Flowsheet Documentation  Taken 2024 by Bella Parker RN  Infection Prevention: rest/sleep promoted     Problem: Anesthesia/Analgesia, Neuraxial  Goal: Absence of Infection Signs and Symptoms  Intervention: Prevent or Manage Infection  Recent Flowsheet Documentation  Taken 2024 by Bella Parker RN  Infection Prevention: rest/sleep promoted  Goal: Optimal Pain Control and Function  Intervention: Prevent or Manage Pain  Recent Flowsheet Documentation  Taken 2024 by Bella Parker RN  Pain Management Interventions:   around-the-clock dosing utilized   pain management plan reviewed with patient/caregiver  Diversional Activities:   television   smartphone  Goal: Effective Oxygenation and Ventilation  Intervention: Optimize Oxygenation and Ventilation  Recent Flowsheet Documentation  Taken 2024 by Bella Parker RN  Body Position: position changed independently  Head of Bed (HOB) Positioning: HOB elevated  Goal: Baseline Motor Function Return  Intervention: Optimize Sensorimotor Function and Safety  Recent Flowsheet Documentation  Taken 2024 by Bella Parker RN  Safety Promotion/Fall Prevention: safety round/check completed     Problem: Postpartum ( Delivery)  Goal: Anesthesia/Sedation Recovery  Intervention: Optimize Anesthesia Recovery  Recent Flowsheet Documentation  Taken 2024 by Bella Parker RN  Safety Promotion/Fall Prevention: safety round/check completed  Administration (IS): self-administered  Goal: Optimal Pain Control and Function  Intervention: Prevent or Manage Pain  Recent Flowsheet Documentation  Taken 2024 by Bella Parker RN  Perineal Care:   perineal hygiene encouraged   perineal spray bottle/warm water use encouraged  Pain Management Interventions:   around-the-clock dosing utilized   pain management plan reviewed with patient/caregiver  Goal: Effective Oxygenation and Ventilation  Intervention:  Optimize Oxygenation and Ventilation  Recent Flowsheet Documentation  Taken 11/19/2024 0830 by Bella Parker RN  Head of Bed (HOB) Positioning: HOB elevated     Problem: Pain Acute  Goal: Optimal Pain Control and Function  Intervention: Optimize Psychosocial Wellbeing  Recent Flowsheet Documentation  Taken 11/19/2024 0830 by Bella Parker RN  Diversional Activities:   television   smartphone  Intervention: Develop Pain Management Plan  Recent Flowsheet Documentation  Taken 11/19/2024 0830 by Bella Parker RN  Pain Management Interventions:   around-the-clock dosing utilized   pain management plan reviewed with patient/caregiver  Intervention: Prevent or Manage Pain  Recent Flowsheet Documentation  Taken 11/19/2024 0830 by Bella Parker RN  Medication Review/Management: medications reviewed     Problem: Fall Injury Risk  Goal: Absence of Fall and Fall-Related Injury  Intervention: Identify and Manage Contributors  Recent Flowsheet Documentation  Taken 11/19/2024 0830 by Bella Parker RN  Medication Review/Management: medications reviewed  Intervention: Promote Injury-Free Environment  Recent Flowsheet Documentation  Taken 11/19/2024 0830 by Bella Parker RN  Safety Promotion/Fall Prevention: safety round/check completed   Goal Outcome Evaluation:

## 2024-11-19 NOTE — DISCHARGE SUMMARY
" [facility]  Delivery Discharge Summary    Primary OB Clinician:     EDC: Estimated Date of Delivery: 24    Gestational Age:36w1d    Antepartum complications: pregnancy-induced hypertension    Date of Delivery: 11/15/2024  Time of Delivery: 4:31 PM    Delivered By:  Osiris Bobo    Delivery Type: , Low Transverse     Tubal Ligation: n/a    Baby:male infant;   Apgar:  7  @ 1 minute /   Apgar:  9  @ 5 minutes   Weight: 3675 g (8 lb 1.6 oz)     Anesthesia: Spinal     Intrapartum complications: PIH    [unfilled]       Lab Results   Component Value Date    ABO O 11/15/2024    RH Positive 11/15/2024        Lab Results   Component Value Date    HGB 11.1 (L) 2024    HCT 33.5 (L) 2024       Subjective   Subjective  Postpartum Day 4: RCS with extensive VERONIKA and partial omentectomy Delivery    The patient feels well.  Her pain is well controlled with nonsteroidal anti-inflammatory drugs and prescribed pain medications.   She is ambulating well.  Patient describes her bleeding as thin lochia.    Breastfeeding: without difficulty.    Objective     Objective:  Vital signs (most recent): Blood pressure 137/89, pulse 91, temperature 98.1 °F (36.7 °C), temperature source Oral, resp. rate 18, height 165.1 cm (65\"), weight 108 kg (238 lb 6.4 oz), last menstrual period 2024, SpO2 95%, currently breastfeeding.     Vital Signs Range for the last 24 hours  Temperature: Temp:  [98.1 °F (36.7 °C)-98.5 °F (36.9 °C)] 98.1 °F (36.7 °C)   Temp Source: Temp src: Oral   BP: BP: (133-137)/(79-89) 137/89   Pulse: Heart Rate:  [] 91   Respirations: Resp:  [18] 18   Weight:       Admit Height:  Height: 165.1 cm (65\")    Physical Exam:  General:  no acute distresss.  Abdomen: Fundus: appropriate, firm, non tender Incision c/d/i  Extremities: normal, atraumatic, no cyanosis, and trace edema.         Assesment and Plan:    POD 4 s/p RCS, VERONIKA and partial omentectomy  Meeting all postop goals for d/c  Infant in NICU with " possible HSV infection.  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  Has latched once but only using shields or bottle now.  Will draw type 1and 2 titers.   CHTN - stable BP on Procardia XL 60 qday.   Follow-up appointment with [facility] in 2 weeks.    Discharge Date: 11/19/2024; Discharge Time: 10:20 EST        Osiris Bobo DO  11/19/2024  10:13 EST

## 2024-11-19 NOTE — LACTATION NOTE
Visited Patient and let her know I was here if she needed and help.  She is pumping her breast milk and taking it to baby in NICU.  Instructed when she wants to try breastfeeding to let me know.

## 2024-11-19 NOTE — PLAN OF CARE
Problem: Adult Inpatient Plan of Care  Goal: Absence of Hospital-Acquired Illness or Injury  Intervention: Identify and Manage Fall Risk  Description: Perform standard risk assessment on admission using a validated tool or comprehensive approach appropriate to the patient; reassess fall risk frequently, with change in status or transfer to another level of care.  Communicate risk to interprofessional healthcare team; ensure fall risk visible cue.  Determine need for increased observation, equipment and environmental modification, as well as use of supportive, nonskid footwear.  Adjust safety measures to individual needs and identified risk factors.  Reinforce the importance of active participation with fall risk prevention, safety, and physical activity with the patient and family.  Perform regular intentional rounding to assess need for position change, pain assessment and personal needs, including assistance with toileting.  Recent Flowsheet Documentation  Taken 11/18/2024 2000 by Lindy Flores RN  Safety Promotion/Fall Prevention: safety round/check completed  Goal: Optimal Comfort and Wellbeing  Intervention: Provide Person-Centered Care  Description: Use a family-focused approach to care; encourage support system presence and participation.  Develop trust and rapport by proactively providing information, encouraging questions, addressing concerns and offering reassurance.  Acknowledge emotional response to hospitalization.  Recognize and utilize personal coping strategies and strengths; develop goals via shared decision-making.  Honor spiritual and cultural preferences.  Recent Flowsheet Documentation  Taken 11/18/2024 2000 by Lindy Flores RN  Trust Relationship/Rapport:   care explained   choices provided     Problem: Hypertensive Disorders in Pregnancy  Goal: Patient-Fetal Stabilization  Intervention: Monitor and Manage Symptom Progression  Description: Regularly evaluate respiratory status;  report presence of abnormal findings.  Assess for behavioral changes, restlessness, epigastric or abdominal pain, visual disturbances or headache not relieved by medication.  Implement seizure precautions.  Anticipate initiation of magnesium sulfate infusion for seizure prevention; continue for up to 24 hours postpartum. Monitor for signs of magnesium toxicity; have calcium gluconate readily available.  Monitor for coagulopathy and signs of abnormal bleeding, such as oozing from intravenous sites or petechiae.  Prepare for delivery based on change in patient-fetal status. Anticipate administration of  corticosteroids and magnesium sulfate for  birth if indicated.  Consult anesthesia to determine labor analgesia or anesthesia for .  Promote anxiety-reducing practices, such as quiet, calm and normothermic environment; relaxation techniques.  Provide calm, reassuring presence; offer clear explanation of events.  Recent Flowsheet Documentation  Taken 2024 by Lindy Flores RN  Medication Review/Management: medications reviewed     Problem: Anesthesia/Analgesia, Neuraxial  Goal: Baseline Motor Function Return  Intervention: Optimize Sensorimotor Function and Safety  Description: Frequently monitor vital signs, oxygenation, dermatomes for level of anesthesia and motor function.  Assess and protect skin and areas of decreased sensation from moisture, heat, pressure, friction or shearing forces.  Position body with joints in neutral alignment; facilitate frequent position changes.  Monitor motor strength and ensure adequate motor function prior to ambulation.  Implement environmental safety measures to decrease fall risk (e.g., declutter, lighting, assistive devices); reassess risk frequently.  Recent Flowsheet Documentation  Taken 2024 by Lindy Flores RN  Safety Promotion/Fall Prevention: safety round/check completed     Problem: Postpartum (  Delivery)  Goal: Anesthesia/Sedation Recovery  Intervention: Optimize Anesthesia Recovery  Description: Assess and monitor airway, breathing and circulation; maintain close surveillance for deterioration.  Implement continuous monitoring, such as cardiorespiratory, blood pressure, temperature, pulse oximetry and capnography.  Elevate head of bed, if able; facilitate regular position changes.  Assess neurocognitive function and for risks that may lead to postoperative delirium, such as decreased level of consciousness, pain and agitation; offer reassurance; answer questions.  Assess and monitor neurovascular and neuromuscular function, such as motor strength, tone, posture, peripheral pulses and extremity sensation; protect areas of decreased sensation from heat, cold, medical devices or objects.  Individualize frequency and intensity of monitoring based on sedation or anesthesia administered, identified risk factors, ongoing assessment and organizational protocol.  Prepare for administration of pharmacologic therapy, such as reversal agent, antiemetic or antipruritic medication, to manage sedation or anesthesia effects.  Adjust environment to maintain safety (e.g., fall precautions, safety equipment).  Recent Flowsheet Documentation  Taken 11/18/2024 2000 by Lindy Flores RN  Safety Promotion/Fall Prevention: safety round/check completed  Administration (IS): self-administered     Problem: Pain Acute  Goal: Optimal Pain Control and Function  Intervention: Prevent or Manage Pain  Description: Evaluate pain level, effect of treatment and patient response at regular intervals.  Minimize painful stimuli; coordinate care and adjust environment (e.g., light, noise, unnecessary movement); promote sleep/rest.  Match pharmacologic analgesia to severity and type of pain mechanism (e.g., neuropathic, muscle, inflammatory); consider multimodal approach.  Provide medication at regular intervals; titrate to patient  response; premedicate for painful procedures.  Manage breakthrough pain with additional doses; consider rotation or switching medication.  Monitor for signs of substance tolerance (increased dose to reach desired effect, decreased effect with same dose).  Manage medication-induced adverse events and side effects.  Provide multimodal interventions, such as physical activity, therapeutic exercise, yoga, TENS (transcutaneous electrical nerve stimulation) and manual therapy.  Train in functional activity modifications, such as body mechanics, posture, ergonomics, energy conservation and activity pacing.  Consider addition of complementary or alternative therapy, such as acupuncture, hypnosis or therapeutic touch.  Recent Flowsheet Documentation  Taken 11/18/2024 2000 by Lindy Flores RN  Medication Review/Management: medications reviewed     Problem: Fall Injury Risk  Goal: Absence of Fall and Fall-Related Injury  Intervention: Identify and Manage Contributors  Description: Develop a fall prevention plan, considering patient-centered interventions and family/caregiver involvement; identify and address patient's facilitators and barriers.  Provide reorientation, appropriate sensory stimulation and routines with changes in mental status to decrease risk of fall.  Promote use of personal vision and auditory aids.  Assess assistance level required for safe and effective self-care; provide support as needed, such as toileting and mobilization. For age 65 and older, implement timed toileting with assistance.  Encourage physical activity, such as performance of mobility and self-care at highest level of patient ability, multicomponent exercise program and provision of appropriate assistive devices.  If fall occurs, assess the severity of injury; implement fall injury protocol. Determine the cause and revise fall injury prevention plan.  Regularly review and advocate for medication adjustment to decrease fall risk;  consider administration times, polypharmacy and age.  Balance adequate pain management with potential for oversedation.  Recent Flowsheet Documentation  Taken 11/18/2024 2000 by Lindy Flores RN  Medication Review/Management: medications reviewed  Intervention: Promote Injury-Free Environment  Description: Provide a safe, barrier-free environment that encourages independent activity.  Keep care area uncluttered and well-lighted.  Determine need for increased observation or monitoring.  Avoid use of devices that minimize mobility, such as restraints or indwelling urinary catheter.  Recent Flowsheet Documentation  Taken 11/18/2024 2000 by Lindy Flores, RN  Safety Promotion/Fall Prevention: safety round/check completed   Goal Outcome Evaluation:  Plan of Care Reviewed With: patient, spouse        Progress: improving  Outcome Evaluation: vitals and bleeding wnl, fundus firm, incision clean, dry and intact

## 2024-11-20 LAB
HSV1 IGG SERPL QL IA: REACTIVE
HSV2 IGG SERPL QL IA: NON REACTIVE

## 2024-11-20 NOTE — PAYOR COMM NOTE
"CONTACT:  PATEL NEWBERRY MSN, RN  UTILIZATION MANAGEMENT DEPT.  [facility]  [address] [place], [address]  PHONE:  [phone]  FAX: [phone]    PATIENT DISCHARGED TO HOME ON 24 AFTER  DELIVERY ON 11/15/24.      Elza Alejo (34 y.o. Female)       Date of Birth   1990    Social Security Number       Address   [address] [place] [address]    Home Phone   [phone]    MRN   [id number]       [facility]    Marital Status                               Admission Date   24    Admission Type   Elective    Admitting Provider   Osiris Bobo DO    Attending Provider       Department, Room/Bed   [facility], W246/       Discharge Date   2024    Discharge Disposition   Home or Self Care    Discharge Destination                                 Attending Provider: (none)   Allergies: Cinnamon, Red Dye #40 (Allura Red)    Isolation: None   Infection: None   Code Status: Prior    Ht: 165.1 cm (65\")   Wt: 108 kg (238 lb 6.4 oz)    Admission Cmt: None   Principal Problem: Decreased fetal movement [O36.8190]                   Active Insurance as of 2024       Primary Coverage       Payor Plan Insurance Group Employer/Plan Group    [employer] BLUE CROSS AN[employer] BLUE CROSS BLUE SHIELD PPO [id number]       Payor Plan Address Payor Plan Phone Number Payor Plan Fax Number Effective Dates    [address] [phone]  3/1/2024 - None Entered    [facility] [id number]         Subscriber Name Subscriber Birth Date Member ID       ELZA ALEJO 1990 [id number]                     Emergency Contacts        (Rel.) Home Phone Work Phone Mobile Phone    SAPNA ROHITH (Spouse) [phone] -- --    Rahel Singer (Mother) [phone] -- --                 Discharge Summary        Osiris Bobo DO at 24 1013          [facility]  Delivery Discharge Summary    Primary OB Clinician:     EDC: Estimated Date of " "Delivery: 24    Gestational Age:36w1d    Antepartum complications: pregnancy-induced hypertension    Date of Delivery: 11/15/2024  Time of Delivery: 4:31 PM    Delivered By:  Osiris Bobo    Delivery Type: , Low Transverse     Tubal Ligation: n/a    Baby:male infant;   Apgar:  7  @ 1 minute /   Apgar:  9  @ 5 minutes   Weight: 3675 g (8 lb 1.6 oz)     Anesthesia: Spinal     Intrapartum complications: PIH    [unfilled]       Lab Results   Component Value Date    ABO O 11/15/2024    RH Positive 11/15/2024        Lab Results   Component Value Date    HGB 11.1 (L) 2024    HCT 33.5 (L) 2024       Subjective   Subjective  Postpartum Day 4: RCS with extensive VERONIKA and partial omentectomy Delivery    The patient feels well.  Her pain is well controlled with nonsteroidal anti-inflammatory drugs and prescribed pain medications.   She is ambulating well.  Patient describes her bleeding as thin lochia.    Breastfeeding: without difficulty.    Objective     Objective:  Vital signs (most recent): Blood pressure 137/89, pulse 91, temperature 98.1 °F (36.7 °C), temperature source Oral, resp. rate 18, height 165.1 cm (65\"), weight 108 kg (238 lb 6.4 oz), last menstrual period 2024, SpO2 95%, currently breastfeeding.     Vital Signs Range for the last 24 hours  Temperature: Temp:  [98.1 °F (36.7 °C)-98.5 °F (36.9 °C)] 98.1 °F (36.7 °C)   Temp Source: Temp src: Oral   BP: BP: (133-137)/(79-89) 137/89   Pulse: Heart Rate:  [] 91   Respirations: Resp:  [18] 18   Weight:       Admit Height:  Height: 165.1 cm (65\")    Physical Exam:  General:  no acute distresss.  Abdomen: Fundus: appropriate, firm, non tender Incision c/d/i  Extremities: normal, atraumatic, no cyanosis, and trace edema.         Assesment and Plan:    POD 4 s/p RCS, VERONIKA and partial omentectomy  Meeting all postop goals for d/c  Infant in NICU with possible HSV infection.  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  Has " latched once but only using shields or bottle now.  Will draw type 1and 2 titers.   CHTN - stable BP on Procardia XL 60 qday.   Follow-up appointment with [facility] in 2 weeks.    Discharge Date: 11/19/2024; Discharge Time: 10:20 EST        Osiris Bobo DO  11/19/2024  10:13 EST      Electronically signed by Osiris Bobo DO at 11/19/24 1020

## 2024-11-21 ENCOUNTER — TELEPHONE (OUTPATIENT)
Dept: LACTATION | Facility: HOSPITAL | Age: 34
End: 2024-11-21
Payer: COMMERCIAL

## 2024-11-27 ENCOUNTER — TELEPHONE (OUTPATIENT)
Dept: LACTATION | Facility: HOSPITAL | Age: 34
End: 2024-11-27
Payer: COMMERCIAL

## 2024-11-29 ENCOUNTER — MATERNAL SCREENING (OUTPATIENT)
Dept: CALL CENTER | Facility: HOSPITAL | Age: 34
End: 2024-11-29
Payer: COMMERCIAL

## 2024-11-29 NOTE — OUTREACH NOTE
Maternal Screening Survey      Flowsheet Row Responses   Facility patient discharged from? [facility]   Attempt successful? No   Unsuccessful attempts Attempt 1              Rachel ESPINOZA - Registered Nurse

## 2024-11-29 NOTE — OUTREACH NOTE
Maternal Screening Survey      Flowsheet Row Responses   Facility patient discharged from? [facility]   Attempt successful? Yes   Call start time 1103   Call end time 1105   I have been able to laugh and see the funny side of things. 0   I have looked forward with enjoyment to things. 0   I have blamed myself unnecessarily when things went wrong. 0   I have been anxious or worried for no good reason. 0   I have felt scared or panicky for no good reason. 0   Things have been getting on top of me. 0   I have been so unhappy that I have had difficulty sleeping. 0   I have felt sad or miserable. 0   I have been so unhappy that I have been crying. 0   The thought of harming myself has occurred to me. 0   [place]  Depression Scale Total 0   Did any of your parents have problems with alcohol or drug use? No   Do any of your peers have problems with alcohol or drug use? No   Does your partner have problems with alcohol or drug use? No   Before you were pregnant did you have problems with alcohol or drug use? (past) No   In the past month, did you drink beer, wine, liquor or use any other drugs? (pregnancy) No   Maternal Screening call completed Yes   Call end time 1105              Rachel ESPINOZA - Registered Nurse

## 2024-12-03 ENCOUNTER — TELEPHONE (OUTPATIENT)
Dept: OBSTETRICS AND GYNECOLOGY | Facility: HOSPITAL | Age: 34
End: 2024-12-03
Payer: COMMERCIAL

## 2024-12-03 NOTE — TELEPHONE ENCOUNTER
Called to check on how breastfeeding was going. Mom stated having trouble latching. Appt. Made for her to come Tuesday, Dec 3 for me to help with breastfeeding.

## 2024-12-03 NOTE — TELEPHONE ENCOUNTER
Mom Came to hospital for help with breastfeeding. Instructions given on latch and things to help with nipple soreness. Nipple shield given to help with nipple to heal some. Will continue to monitor mom and infant.

## 2025-01-30 ENCOUNTER — APPOINTMENT (OUTPATIENT)
Dept: GENERAL RADIOLOGY | Facility: HOSPITAL | Age: 35
End: 2025-01-30
Payer: COMMERCIAL

## 2025-01-30 ENCOUNTER — HOSPITAL ENCOUNTER (EMERGENCY)
Facility: HOSPITAL | Age: 35
Discharge: HOME | End: 2025-01-30
Payer: COMMERCIAL

## 2025-01-30 VITALS
HEART RATE: 74 BPM | TEMPERATURE: 97.9 F | WEIGHT: 238.1 LBS | SYSTOLIC BLOOD PRESSURE: 113 MMHG | HEIGHT: 65 IN | BODY MASS INDEX: 39.67 KG/M2 | DIASTOLIC BLOOD PRESSURE: 62 MMHG | OXYGEN SATURATION: 98 % | RESPIRATION RATE: 18 BRPM

## 2025-01-30 DIAGNOSIS — J02.8 PHARYNGITIS DUE TO OTHER ORGANISM: Primary | ICD-10-CM

## 2025-01-30 LAB
FLUAV RNA RESP QL NAA+PROBE: NOT DETECTED
FLUBV RNA RESP QL NAA+PROBE: NOT DETECTED
S PYO AG THROAT QL: NEGATIVE
SARS-COV-2 RNA RESP QL NAA+PROBE: NOT DETECTED

## 2025-01-30 PROCEDURE — 99283 EMERGENCY DEPT VISIT LOW MDM: CPT

## 2025-01-30 PROCEDURE — 87636 SARSCOV2 & INF A&B AMP PRB: CPT | Performed by: NURSE PRACTITIONER

## 2025-01-30 PROCEDURE — 87081 CULTURE SCREEN ONLY: CPT | Performed by: NURSE PRACTITIONER

## 2025-01-30 PROCEDURE — 71045 X-RAY EXAM CHEST 1 VIEW: CPT | Performed by: RADIOLOGY

## 2025-01-30 PROCEDURE — 87880 STREP A ASSAY W/OPTIC: CPT | Performed by: NURSE PRACTITIONER

## 2025-01-30 PROCEDURE — 71045 X-RAY EXAM CHEST 1 VIEW: CPT

## 2025-01-30 RX ORDER — FLUCONAZOLE 200 MG/1
200 TABLET ORAL ONCE
Qty: 1 TABLET | Refills: 0 | Status: SHIPPED | OUTPATIENT
Start: 2025-01-30 | End: 2025-01-30

## 2025-01-30 RX ADMIN — AMOXICILLIN AND CLAVULANATE POTASSIUM 1 TABLET: 875; 125 TABLET, FILM COATED ORAL at 11:20

## 2025-01-30 NOTE — ED PROVIDER NOTES
Subjective   History of Present Illness  Patient is a 34-year-old female with significant past medical history positive for allergies, anxiety, hypertension, presenting to the ER complaints of a 1 week history of sore throat, congestion, cough.  Patient denies any known sick contacts or recent foreign travel.  Patient is 2 months postpartum.  Patient denies recent antibiotic use.  Patient denies any additional symptoms at this time.  Patient denies any shortness of air, CP, fever, nausea, vomiting, abd pain or any additional symptoms.     History provided by:  Patient   used: No        Review of Systems   Constitutional: Negative.  Negative for fever.   HENT:  Positive for congestion and sore throat.    Respiratory:  Positive for cough.    Cardiovascular: Negative.  Negative for chest pain.   Gastrointestinal: Negative.  Negative for abdominal pain.   Endocrine: Negative.    Genitourinary: Negative.  Negative for dysuria.   Skin: Negative.    Neurological: Negative.    Psychiatric/Behavioral: Negative.     All other systems reviewed and are negative.      Past Medical History:   Diagnosis Date    Allergic     Anxiety     Depression     Heart murmur     Heartburn during pregnancy     Hypertension affecting pregnancy     Kidney stone     Kidney stones     Otitis media     Polycystic ovary syndrome     Preeclampsia     PUPP (pruritic urticarial papules and plaques of pregnancy)     WITH FIRST PREGNANCY    Sinusitis     Urinary tract infection        Allergies   Allergen Reactions    Cinnamon Anaphylaxis    Red Dye #40 (Allura Red) Hives       Past Surgical History:   Procedure Laterality Date     SECTION Bilateral 2021    Procedure:  SECTION PRIMARY;  Surgeon: Richard Dumont III, MD;  Location:  [facility];  Service: Obstetrics/Gynecology;  Laterality: Bilateral;     SECTION Bilateral 11/15/2024    Procedure:  SECTION REPEAT;  Surgeon: Osiris Bobo  DO MICHELLE;  Location:  [facility];  Service: Obstetrics/Gynecology;  Laterality: Bilateral;    CYSTOSCOPY URETEROSCOPY LASER LITHOTRIPSY      EXPLORATORY LAPAROTOMY N/A 11/15/2024    Procedure: LAPAROTOMY WITH LYSIS OF ADHESIONS;  Surgeon: Osiris Bobo DO;  Location:  [facility];  Service: Obstetrics/Gynecology;  Laterality: N/A;    KIDNEY STONE SURGERY Right 2017    MOUTH SURGERY      UPPER AND LOWER    OMENTECTOMY N/A 11/15/2024    Procedure: OMENTECTOMY;  Surgeon: Osiris Bobo DO;  Location:  [facility];  Service: Obstetrics/Gynecology;  Laterality: N/A;    SEPTOPLASTY      WISDOM TOOTH EXTRACTION         Family History   Problem Relation Age of Onset    Nephrolithiasis Mother     Hypertension Mother     Heart disease Mother     Melanoma Mother     Anxiety disorder Mother     Heart murmur Mother     Nephrolithiasis Father     Hypertension Brother     Nephrolithiasis Brother     Hypertension Maternal Aunt     Heart disease Maternal Grandmother     Diabetes Maternal Grandmother     Heart disease Maternal Grandfather     Heart failure Maternal Grandfather     Heart attack Maternal Grandfather     Anxiety disorder Paternal Grandmother     Heart failure Paternal Grandmother     Melanoma Paternal Grandmother     Heart attack Paternal Grandfather     Heart disease Paternal Grandfather     Alcohol abuse Paternal Grandfather        Social History     Socioeconomic History    Marital status:    Tobacco Use    Smoking status: Never     Passive exposure: Never    Smokeless tobacco: Never   Vaping Use    Vaping status: Never Used   Substance and Sexual Activity    Alcohol use: Not Currently    Drug use: No    Sexual activity: Never           Objective   Physical Exam  Vitals and nursing note reviewed.   Constitutional:       General: She is not in acute distress.     Appearance: She is well-developed. She is not diaphoretic.   HENT:      Head: Normocephalic and atraumatic.      Right  Ear: External ear normal.      Left Ear: External ear normal.      Nose: Nose normal. Congestion and rhinorrhea present.      Mouth/Throat:      Pharynx: Oropharyngeal exudate and posterior oropharyngeal erythema present.   Eyes:      Conjunctiva/sclera: Conjunctivae normal.      Pupils: Pupils are equal, round, and reactive to light.   Neck:      Vascular: No JVD.      Trachea: No tracheal deviation.   Cardiovascular:      Rate and Rhythm: Normal rate and regular rhythm.      Heart sounds: Normal heart sounds. No murmur heard.  Pulmonary:      Effort: Pulmonary effort is normal. No respiratory distress.      Breath sounds: Normal breath sounds. No wheezing.   Abdominal:      General: Bowel sounds are normal.      Palpations: Abdomen is soft.      Tenderness: There is no abdominal tenderness.   Musculoskeletal:         General: No deformity. Normal range of motion.      Cervical back: Normal range of motion and neck supple.   Skin:     General: Skin is warm and dry.      Coloration: Skin is not pale.      Findings: No erythema or rash.   Neurological:      Mental Status: She is alert and oriented to person, place, and time.      Cranial Nerves: No cranial nerve deficit.   Psychiatric:         Behavior: Behavior normal.         Thought Content: Thought content normal.         Procedures       Results for orders placed or performed during the hospital encounter of 01/30/25   Rapid Strep A Screen - Swab, Throat    Collection Time: 01/30/25 10:29 AM    Specimen: Throat; Swab   Result Value Ref Range    Strep A Ag Negative Negative   COVID-19 and FLU A/B PCR, 1 HR TAT - Swab, Nasopharynx    Collection Time: 01/30/25 10:29 AM    Specimen: Nasopharynx; Swab   Result Value Ref Range    COVID19 Not Detected Not Detected - Ref. Range    Influenza A PCR Not Detected Not Detected    Influenza B PCR Not Detected Not Detected      XR Chest 1 View   Final Result   No acute cardiopulmonary process.           This report was  finalized on 1/30/2025 10:50 AM by Matilda Perdomo M.D..               ED Course  ED Course as of 01/30/25 1154   Thu Jan 30, 2025   1056 XR Chest 1 View [SS]      ED Course User Index  [SS] Susan Shannon APRN                                                       Medical Decision Making  Patient is a 34-year-old female with significant past medical history positive for allergies, anxiety, hypertension, presenting to the ER complaints of a 1 week history of sore throat, congestion, cough.  Patient denies any known sick contacts or recent foreign travel.  Patient is 2 months postpartum.  Patient denies recent antibiotic use.  Patient denies any additional symptoms at this time.  Patient denies any shortness of air, CP, fever, nausea, vomiting, abd pain or any additional symptoms.     MDM:    Escalation of care including admission/observation considered    - Discussions of management with other providers:  None    - Discussed/reviewed with Radiology regarding test interpretation    - Independent interpretation: None    - Additional patient history obtained from: None    - Review of external non-ED record (if available):  Prior Inpt record, Office record, Outpt record, Prior Outpt labs, PCP record, Outside ED record, Other    - Chronic conditions affecting care: See HPI and medical Hx.    - Social Determinants of health significantly affecting care:  None        Medical Decision Making Discussion:    URI/pharyngitis    The patient has been given very strict return precautions to return to the emergency department should there be any acute change or worsening of their condition.  I have explained my findings and the patient has expressed understanding to me.  I explained that the work-up performed in the ED has been based on the specific complaint and concern, as the nature of emergency medicine is complaint driven and they understand that new symptoms may arise.  I have told them that, should there be any new  symptoms, worsening or changing symptoms, a new work-up may be indicated that they are encouraged to return to the emergency department or promptly contact their primary care physician. We have employed a shared decision-making process as the discussion of their disposition.  The patient has been educated as to the nature of the visit, the tests and work-up performed and the findings from today's visit. At this time, there does not appear to be any acute emergent process that necessitates admission to the hospital, however, the patient understands that this can change unexpectedly. At this time, the patient is stable for discharge home and agrees to follow-up with her primary care physician in the next 24 to 48 hours or earlier should they be able to obtain an appointment.    The patient was counseled regarding diagnostic results and treatment plan and patient has indicated understanding of these instructions.    Problems Addressed:  Pharyngitis due to other organism: complicated acute illness or injury    Amount and/or Complexity of Data Reviewed  Radiology: ordered. Decision-making details documented in ED Course.    Risk  Prescription drug management.        Final diagnoses:   Pharyngitis due to other organism       ED Disposition  ED Disposition       ED Disposition   Discharge    Condition   Stable    Comment   --               Uriel Cesar, DO  [address] [place], [place] [id number] [phone]    Schedule an appointment as soon as possible for a visit            Medication List        New Prescriptions      amoxicillin-clavulanate 875-125 MG per tablet  Commonly known as: AUGMENTIN  Take 1 tablet by mouth 2 (Two) Times a Day for 10 days.            Changed      * fluconazole 150 MG tablet  Commonly known as: DIFLUCAN  Take 1 tablet by mouth Every 72 (Seventy-Two) Hours.  What changed: Another medication with the same name was added. Make sure you understand how and when to take each.     * fluconazole  200 MG tablet  Commonly known as: DIFLUCAN  Take 1 tablet by mouth 1 (One) Time for 1 dose.  What changed: You were already taking a medication with the same name, and this prescription was added. Make sure you understand how and when to take each.           * This list has 2 medication(s) that are the same as other medications prescribed for you. Read the directions carefully, and ask your doctor or other care provider to review them with you.                   Where to Get Your Medications        These medications were sent to [facility] [address] [phone] [facility] [phone] [address] [address]      Phone: [phone]   amoxicillin-clavulanate 875-125 MG per tablet  fluconazole 200 MG tablet            Susan Shannon, VESTA  01/30/25 1152       Susan Shannon, VESTA  01/30/25 4540

## 2025-02-01 LAB — BACTERIA SPEC AEROBE CULT: NORMAL

## (undated) DEVICE — SLV SCD CALF HEMOFORCE DVT THERP REPR LG

## (undated) DEVICE — TRY SPINE PENCAN 24GA X4IN

## (undated) DEVICE — PK C/SECT 70

## (undated) DEVICE — SKIN AFFIX SURG ADHESIVE 72/CS 0.55ML: Brand: MEDLINE

## (undated) DEVICE — APPL CHLORAPREP W/TINT 26ML ORNG

## (undated) DEVICE — BG RESUSCITATOR INFANT BABYBLUE2

## (undated) DEVICE — ENSEAL 20 CM SHAFT, LARGE JAW: Brand: ENSEAL X1

## (undated) DEVICE — CUFF SCD HEMOFORCE SEQ CALF STD MD

## (undated) DEVICE — HYDROGEL COATED LATEX URINE METER FOLEY TRAY,16 FR/CH (5.3 MM), 5 ML CATHETER PRE-CONNECTED TO 2000 ML DRAINAGE BAG WITH NEEDLE SAMPLING: Brand: DOVER